# Patient Record
Sex: MALE | Race: WHITE | NOT HISPANIC OR LATINO | Employment: OTHER | URBAN - NONMETROPOLITAN AREA
[De-identification: names, ages, dates, MRNs, and addresses within clinical notes are randomized per-mention and may not be internally consistent; named-entity substitution may affect disease eponyms.]

---

## 2023-03-15 PROBLEM — C49.9 SOFT TISSUE SARCOMA (MULTI): Status: ACTIVE | Noted: 2023-03-15

## 2023-03-15 PROBLEM — I10 BENIGN HYPERTENSION: Status: ACTIVE | Noted: 2023-03-15

## 2023-03-15 PROBLEM — S31.109A WOUND OF ABDOMEN: Status: ACTIVE | Noted: 2023-03-15

## 2023-03-15 PROBLEM — M19.90 ARTHRITIS: Status: ACTIVE | Noted: 2023-03-15

## 2023-03-15 PROBLEM — J32.9 SINUSITIS: Status: ACTIVE | Noted: 2023-03-15

## 2023-03-15 PROBLEM — G47.00 INSOMNIA: Status: ACTIVE | Noted: 2023-03-15

## 2023-03-15 RX ORDER — LORATADINE 10 MG/1
1 TABLET ORAL DAILY PRN
COMMUNITY
End: 2023-12-18 | Stop reason: ALTCHOICE

## 2023-03-15 RX ORDER — ATENOLOL 50 MG/1
1 TABLET ORAL DAILY
COMMUNITY
Start: 2022-01-12 | End: 2023-09-26 | Stop reason: DRUGHIGH

## 2023-03-15 RX ORDER — AMLODIPINE BESYLATE 5 MG/1
1 TABLET ORAL DAILY
COMMUNITY
End: 2023-04-13 | Stop reason: ALTCHOICE

## 2023-03-15 NOTE — PROGRESS NOTES
Subjective   Patient ID:   Louie Urbina is a 77 y.o. male who presents for Follow-up, Hypertension, and Rash.  HPI  Here with acute symptoms:  Symptoms x over 1 week.  Itchy, red, scattered rash.  Located in between legs, behind his knees, on buttocks.  Has not improved.  No new detergents/clothing.    HTN:  Taking Amlodipine and Atenolol.  BP today is 159/84.  Improved slightly on re-check.  Denies dizziness, headaches.    Review of Systems  12 point review of systems negative unless stated above in HPI    Vitals:    03/16/23 1456   BP: 159/84   Pulse: 62   Resp: 18   SpO2: 97%       Physical Exam  General: Alert and oriented, well nourished, no acute distress.  Lungs: Clear to auscultation, non-labored respiration.  Heart: Normal rate, regular rhythm, no murmur, gallop or edema.  Neurologic: Awake, alert, and oriented X3, CN II-XII intact.  Psychiatric: Cooperative, appropriate mood and affect.  Skin: Erythematous, maculopapular rash in between both legs, behind both knees. Ringworm rash behind both knees as well    Assessment/Plan   This looks to be eczema vs. Ringworm for some of these lesions.  I have sent in Prednisone, Triamcinolone cream, and Clotrimazole cream.  Consider dermatology.  If symptoms persist or worsen despite current plan of care, please contact your healthcare provider for further evaluation.  Patient instructed to contact the office if there are any questions regarding their care or treatment.   [facility] [phone].  [facility] [phone].  Please start checking your BP at home and keep a daily log.  Call if getting over 140/90.    Fu 3 months  Diagnoses and all orders for this visit:  Benign hypertension  Eczema, unspecified type  -     predniSONE (Deltasone) 20 mg tablet; TAKE 2 TABLETS BY MOUTH DAYS 1-7, THEN 1 TABLET BY MOUTH DAYS 8-14.  -     triamcinolone (Kenalog) 0.1 % cream; Apply topically 2 times a day. Apply to affected area 1-2 times  daily as needed.  Ringworm  -     clotrimazole (Lotrimin) 1 % cream; Apply topically 2 times a day. apply to affected area

## 2023-03-16 ENCOUNTER — OFFICE VISIT (OUTPATIENT)
Dept: PRIMARY CARE | Facility: CLINIC | Age: 78
End: 2023-03-16
Payer: MEDICARE

## 2023-03-16 VITALS
OXYGEN SATURATION: 97 % | BODY MASS INDEX: 26.46 KG/M2 | SYSTOLIC BLOOD PRESSURE: 159 MMHG | WEIGHT: 189 LBS | DIASTOLIC BLOOD PRESSURE: 84 MMHG | HEIGHT: 71 IN | RESPIRATION RATE: 18 BRPM | HEART RATE: 62 BPM

## 2023-03-16 DIAGNOSIS — B35.9 RINGWORM: ICD-10-CM

## 2023-03-16 DIAGNOSIS — I10 BENIGN HYPERTENSION: Primary | ICD-10-CM

## 2023-03-16 DIAGNOSIS — L30.9 ECZEMA, UNSPECIFIED TYPE: ICD-10-CM

## 2023-03-16 PROCEDURE — 1159F MED LIST DOCD IN RCRD: CPT | Performed by: PHYSICIAN ASSISTANT

## 2023-03-16 PROCEDURE — 1160F RVW MEDS BY RX/DR IN RCRD: CPT | Performed by: PHYSICIAN ASSISTANT

## 2023-03-16 PROCEDURE — 3079F DIAST BP 80-89 MM HG: CPT | Performed by: PHYSICIAN ASSISTANT

## 2023-03-16 PROCEDURE — 99213 OFFICE O/P EST LOW 20 MIN: CPT | Performed by: PHYSICIAN ASSISTANT

## 2023-03-16 PROCEDURE — 3077F SYST BP >= 140 MM HG: CPT | Performed by: PHYSICIAN ASSISTANT

## 2023-03-16 RX ORDER — CLOTRIMAZOLE 1 %
CREAM (GRAM) TOPICAL 2 TIMES DAILY
Qty: 60 G | Refills: 1 | Status: SHIPPED | OUTPATIENT
Start: 2023-03-16 | End: 2023-04-13 | Stop reason: ALTCHOICE

## 2023-03-16 RX ORDER — PREDNISONE 20 MG/1
TABLET ORAL
Qty: 21 TABLET | Refills: 0 | Status: SHIPPED | OUTPATIENT
Start: 2023-03-16 | End: 2023-04-13 | Stop reason: ALTCHOICE

## 2023-03-16 RX ORDER — TRIAMCINOLONE ACETONIDE 1 MG/G
CREAM TOPICAL 2 TIMES DAILY
Qty: 45 G | Refills: 2 | Status: SHIPPED | OUTPATIENT
Start: 2023-03-16 | End: 2023-04-13 | Stop reason: ALTCHOICE

## 2023-03-16 ASSESSMENT — PATIENT HEALTH QUESTIONNAIRE - PHQ9
SUM OF ALL RESPONSES TO PHQ9 QUESTIONS 1 AND 2: 0
1. LITTLE INTEREST OR PLEASURE IN DOING THINGS: NOT AT ALL
2. FEELING DOWN, DEPRESSED OR HOPELESS: NOT AT ALL

## 2023-03-16 ASSESSMENT — PAIN SCALES - GENERAL: PAINLEVEL: 0-NO PAIN

## 2023-04-12 NOTE — PROGRESS NOTES
Subjective   Patient ID:   Louie Urbina is a 77 y.o. male who presents for No chief complaint on file..  HPI  Eczema vs. Ringworm:  I saw him in March 2023 for this.  I gave Prednisone, Clotrimazole, and Triamcinolone creams.  Itchy, red, scattered rash.  Located in between legs, behind his knees, on buttocks.  No new detergents/clothing.    HTN:  Taking Amlodipine and Atenolol.  BP today is  Denies dizziness, headaches.    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    Health maintenance:  Smoking: Former smoker.  PSA (50+): DUE  Labs: Feb 2023. DUE for PSA, lipid   Prevnar 13 (65+): DUE  Pneumovax 23 (65+, 1 year after Prev 13):  Influenza:  Zostavax (60+, 1 time, at pharmacy): DUE    Review of Systems  12 point review of systems negative unless stated above in HPI    There were no vitals filed for this visit.      Physical Exam  General: Alert and oriented, well nourished, no acute distress.  Lungs: Clear to auscultation, non-labored respiration.  Heart: Normal rate, regular rhythm, no murmur, gallop or edema.  Neurologic: Awake, alert, and oriented X3, CN II-XII intact.  Psychiatric: Cooperative, appropriate mood and affect.  Skin: Erythematous, maculopapular rash in between both legs, behind both knees. Ringworm rash behind both knees as well    Assessment/Plan   Diagnoses and all orders for this visit:  Benign hypertension  Eczema, unspecified type  Ringworm  Soft tissue sarcoma (CMS/HCC)  Screening PSA (prostate specific antigen)

## 2023-04-13 ENCOUNTER — OFFICE VISIT (OUTPATIENT)
Dept: PRIMARY CARE | Facility: CLINIC | Age: 78
End: 2023-04-13
Payer: MEDICARE

## 2023-04-13 VITALS
RESPIRATION RATE: 18 BRPM | BODY MASS INDEX: 27.23 KG/M2 | DIASTOLIC BLOOD PRESSURE: 86 MMHG | HEART RATE: 63 BPM | SYSTOLIC BLOOD PRESSURE: 158 MMHG | WEIGHT: 190.2 LBS | OXYGEN SATURATION: 97 % | HEIGHT: 70 IN

## 2023-04-13 DIAGNOSIS — B35.9 RINGWORM: ICD-10-CM

## 2023-04-13 DIAGNOSIS — C49.9 SOFT TISSUE SARCOMA (MULTI): ICD-10-CM

## 2023-04-13 DIAGNOSIS — Z12.5 SCREENING PSA (PROSTATE SPECIFIC ANTIGEN): ICD-10-CM

## 2023-04-13 DIAGNOSIS — L30.9 ECZEMA, UNSPECIFIED TYPE: ICD-10-CM

## 2023-04-13 DIAGNOSIS — I10 BENIGN HYPERTENSION: Primary | ICD-10-CM

## 2023-04-13 PROCEDURE — 1160F RVW MEDS BY RX/DR IN RCRD: CPT | Performed by: PHYSICIAN ASSISTANT

## 2023-04-13 PROCEDURE — 99213 OFFICE O/P EST LOW 20 MIN: CPT | Performed by: PHYSICIAN ASSISTANT

## 2023-04-13 PROCEDURE — 3079F DIAST BP 80-89 MM HG: CPT | Performed by: PHYSICIAN ASSISTANT

## 2023-04-13 PROCEDURE — 3077F SYST BP >= 140 MM HG: CPT | Performed by: PHYSICIAN ASSISTANT

## 2023-04-13 PROCEDURE — 1159F MED LIST DOCD IN RCRD: CPT | Performed by: PHYSICIAN ASSISTANT

## 2023-04-13 RX ORDER — AMLODIPINE BESYLATE 10 MG/1
10 TABLET ORAL DAILY
Qty: 90 TABLET | Refills: 1 | Status: SHIPPED | OUTPATIENT
Start: 2023-04-13 | End: 2024-01-26 | Stop reason: SDUPTHER

## 2023-04-13 RX ORDER — CLOTRIMAZOLE 1 %
CREAM (GRAM) TOPICAL 2 TIMES DAILY
Qty: 113 G | Refills: 3 | Status: SHIPPED | OUTPATIENT
Start: 2023-04-13 | End: 2023-05-13

## 2023-04-13 RX ORDER — TRIAMCINOLONE ACETONIDE 1 MG/G
CREAM TOPICAL 2 TIMES DAILY
Qty: 80 G | Refills: 3 | Status: SHIPPED | OUTPATIENT
Start: 2023-04-13 | End: 2023-12-18 | Stop reason: ALTCHOICE

## 2023-04-13 ASSESSMENT — PAIN SCALES - GENERAL: PAINLEVEL: 0-NO PAIN

## 2023-04-13 NOTE — PROGRESS NOTES
Subjective   Patient ID:   Louie Urbina is a 77 y.o. male who presents for Follow-up.  HPI  Eczema vs. Ringworm:  This has improved a bit.  I saw him in March 2023 for this.  I gave Prednisone, Clotrimazole, and Triamcinolone creams.  Itchy, red, scattered rash.  Located in between legs, behind his knees, on buttocks.  No new detergents/clothing.    HTN:  Taking Amlodipine and Atenolol.  BP today is 158/86.  BP was high last visit too.  Denies dizziness, headaches.    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    Health maintenance:  Smoking: Former smoker.  PSA (50+): DUE  Labs: Feb 2023. DUE for PSA, lipid   Prevnar 13 (65+): DUE  Pneumovax 23 (65+, 1 year after Prev 13):  Influenza:  Zostavax (60+, 1 time, at pharmacy): DUE    Review of Systems  12 point review of systems negative unless stated above in HPI    Vitals:    04/13/23 1352   BP: 158/86   Pulse: 63   Resp: 18   SpO2: 97%       Physical Exam  General: Alert and oriented, well nourished, no acute distress.  Lungs: Clear to auscultation, non-labored respiration.  Heart: Normal rate, regular rhythm, no murmur, gallop or edema.  Neurologic: Awake, alert, and oriented X3, CN II-XII intact.  Psychiatric: Cooperative, appropriate mood and affect.    Assessment/Plan   I am glad your rash is improving!  I have sent more of the creams in.  BP is high today.  I have increased your Amlodipine to 10mg.  Consider checking at home.  I have ordered some labs to be done as soon as you can.  We will call you with the results.  Continue the same medications.  Chronic conditions are stable.  Call with questions or concerns.    F/u  2-3 months  Diagnoses and all orders for this visit:  Benign hypertension  -     Lipid Panel; Future  Eczema, unspecified type  Ringworm  Soft tissue sarcoma (CMS/HCC)  Screening PSA (prostate specific antigen)  -     Prostate Specific Antigen, Screen; Future

## 2023-05-01 NOTE — PROGRESS NOTES
Subjective   Patient ID:   Louie Urbina is a 77 y.o. male who presents for Follow-up.  HPI  PSA and lipid were ordered and not done.    Eczema vs. Ringworm:  This has improved a bit.  I saw him in March 2023 for this.  I gave Prednisone, Clotrimazole, and Triamcinolone creams.  Itchy, red, scattered rash.  Located in between legs, behind his knees, on buttocks.  No new detergents/clothing.    HTN:  Had been taking Amlodipine and Atenolol.  He has been checking his BP at home and it was running low.  Interestingly, his heart rate was going high.  He has since stopped both the Amlodipine and Atenolol and both BP and heart rate have been good at home.  Feeling a little tired since stopping these.  BP is excellent in office today.  BP today is 125/84.  Denies dizziness, headaches.    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    Health maintenance:  Smoking: Former smoker.  PSA (50+): DUE  Labs: Feb 2023. DUE for PSA, lipid   Prevnar 13 (65+): DUE  Pneumovax 23 (65+, 1 year after Prev 13):  Influenza:  Zostavax (60+, 1 time, at pharmacy): DUE    Review of Systems  12 point review of systems negative unless stated above in HPI    Vitals:    05/04/23 1425   BP: 125/84   Pulse: 92   Resp: 18   SpO2: 97%         Physical Exam  General: Alert and oriented, well nourished, no acute distress.  Lungs: Clear to auscultation, non-labored respiration.  Heart: Normal rate, regular rhythm, no murmur, gallop or edema.  Neurologic: Awake, alert, and oriented X3, CN II-XII intact.  Psychiatric: Cooperative, appropriate mood and affect.    Assessment/Plan   We can try stopping both the Amlodipine and Atenolol for now.  Continue monitoring BP at home.  Call if this is going above 140/90.  I have ordered some labs to be done as soon as you can.  We will call you with the results.  Continue the same medications.  Chronic conditions are stable.  Call with questions or concerns.    F/u  6 months  Diagnoses and all orders for this  visit:  Benign hypertension  -     Lipid Panel; Future  Ringworm  Soft tissue sarcoma (CMS/HCC)  Screening PSA (prostate specific antigen)  -     Prostate Specific Antigen, Screen; Future

## 2023-05-04 ENCOUNTER — OFFICE VISIT (OUTPATIENT)
Dept: PRIMARY CARE | Facility: CLINIC | Age: 78
End: 2023-05-04
Payer: MEDICARE

## 2023-05-04 VITALS
DIASTOLIC BLOOD PRESSURE: 84 MMHG | WEIGHT: 181 LBS | SYSTOLIC BLOOD PRESSURE: 125 MMHG | HEART RATE: 92 BPM | HEIGHT: 70 IN | OXYGEN SATURATION: 97 % | RESPIRATION RATE: 18 BRPM | BODY MASS INDEX: 25.91 KG/M2

## 2023-05-04 DIAGNOSIS — Z12.5 SCREENING PSA (PROSTATE SPECIFIC ANTIGEN): ICD-10-CM

## 2023-05-04 DIAGNOSIS — B35.9 RINGWORM: ICD-10-CM

## 2023-05-04 DIAGNOSIS — C49.9 SOFT TISSUE SARCOMA (MULTI): ICD-10-CM

## 2023-05-04 DIAGNOSIS — I10 BENIGN HYPERTENSION: Primary | ICD-10-CM

## 2023-05-04 PROCEDURE — 3079F DIAST BP 80-89 MM HG: CPT | Performed by: PHYSICIAN ASSISTANT

## 2023-05-04 PROCEDURE — 1160F RVW MEDS BY RX/DR IN RCRD: CPT | Performed by: PHYSICIAN ASSISTANT

## 2023-05-04 PROCEDURE — 1159F MED LIST DOCD IN RCRD: CPT | Performed by: PHYSICIAN ASSISTANT

## 2023-05-04 PROCEDURE — 99213 OFFICE O/P EST LOW 20 MIN: CPT | Performed by: PHYSICIAN ASSISTANT

## 2023-05-04 PROCEDURE — 3074F SYST BP LT 130 MM HG: CPT | Performed by: PHYSICIAN ASSISTANT

## 2023-05-04 ASSESSMENT — PAIN SCALES - GENERAL: PAINLEVEL: 0-NO PAIN

## 2023-05-15 ENCOUNTER — TELEPHONE (OUTPATIENT)
Dept: PRIMARY CARE | Facility: CLINIC | Age: 78
End: 2023-05-15
Payer: MEDICARE

## 2023-05-15 DIAGNOSIS — B97.89 SORE THROAT (VIRAL): ICD-10-CM

## 2023-05-15 DIAGNOSIS — J02.8 SORE THROAT (VIRAL): ICD-10-CM

## 2023-05-15 RX ORDER — AZITHROMYCIN 500 MG/1
500 TABLET, FILM COATED ORAL DAILY
Qty: 5 TABLET | Refills: 0 | Status: SHIPPED | OUTPATIENT
Start: 2023-05-15 | End: 2023-05-20

## 2023-05-15 NOTE — TELEPHONE ENCOUNTER
Pt called stated he has a sore throat and clear sinus drainage  Discussed with Dr. Knox start zithromax 500mg daily   Can also try mucinex otc

## 2023-05-26 ENCOUNTER — TELEPHONE (OUTPATIENT)
Dept: PRIMARY CARE | Facility: CLINIC | Age: 78
End: 2023-05-26
Payer: MEDICARE

## 2023-05-26 DIAGNOSIS — J32.9 SINUSITIS, UNSPECIFIED CHRONICITY, UNSPECIFIED LOCATION: Primary | ICD-10-CM

## 2023-05-26 RX ORDER — DOXYCYCLINE 100 MG/1
100 CAPSULE ORAL 2 TIMES DAILY
Qty: 20 CAPSULE | Refills: 0 | Status: SHIPPED | OUTPATIENT
Start: 2023-05-26 | End: 2023-06-05

## 2023-08-09 ENCOUNTER — TELEPHONE (OUTPATIENT)
Dept: PRIMARY CARE | Facility: CLINIC | Age: 78
End: 2023-08-09
Payer: MEDICARE

## 2023-08-09 NOTE — TELEPHONE ENCOUNTER
----- Message from Alejandra Yen LPN sent at 7/19/2023  3:57 PM EDT -----  Due for medicare phys

## 2023-08-23 ENCOUNTER — OFFICE VISIT (OUTPATIENT)
Dept: PRIMARY CARE | Facility: CLINIC | Age: 78
End: 2023-08-23
Payer: MEDICARE

## 2023-08-23 VITALS
WEIGHT: 183.8 LBS | RESPIRATION RATE: 18 BRPM | DIASTOLIC BLOOD PRESSURE: 109 MMHG | HEART RATE: 70 BPM | BODY MASS INDEX: 26.31 KG/M2 | HEIGHT: 70 IN | SYSTOLIC BLOOD PRESSURE: 184 MMHG | OXYGEN SATURATION: 98 %

## 2023-08-23 DIAGNOSIS — Z00.00 MEDICARE ANNUAL WELLNESS VISIT, SUBSEQUENT: ICD-10-CM

## 2023-08-23 DIAGNOSIS — Z12.11 COLON CANCER SCREENING: ICD-10-CM

## 2023-08-23 DIAGNOSIS — I10 BENIGN HYPERTENSION: ICD-10-CM

## 2023-08-23 PROBLEM — D48.5 NEOPLASM OF UNCERTAIN BEHAVIOR OF SKIN: Status: ACTIVE | Noted: 2023-08-01

## 2023-08-23 PROBLEM — L91.8 OTHER HYPERTROPHIC DISORDERS OF THE SKIN: Status: ACTIVE | Noted: 2023-08-01

## 2023-08-23 PROBLEM — D22.60 MELANOCYTIC NEVI OF UNSPECIFIED UPPER LIMB, INCLUDING SHOULDER: Status: ACTIVE | Noted: 2023-08-01

## 2023-08-23 PROBLEM — D22.70 MELANOCYTIC NEVI OF UNSPECIFIED LOWER LIMB, INCLUDING HIP: Status: ACTIVE | Noted: 2023-08-01

## 2023-08-23 PROBLEM — L82.1 OTHER SEBORRHEIC KERATOSIS: Status: ACTIVE | Noted: 2023-08-01

## 2023-08-23 PROBLEM — D22.5 MELANOCYTIC NEVI OF TRUNK: Status: ACTIVE | Noted: 2023-08-01

## 2023-08-23 PROBLEM — D18.01 HEMANGIOMA OF SKIN AND SUBCUTANEOUS TISSUE: Status: ACTIVE | Noted: 2023-08-01

## 2023-08-23 PROBLEM — L81.4 OTHER MELANIN HYPERPIGMENTATION: Status: ACTIVE | Noted: 2023-08-01

## 2023-08-23 PROBLEM — L80 VITILIGO: Status: ACTIVE | Noted: 2023-08-01

## 2023-08-23 PROCEDURE — 3077F SYST BP >= 140 MM HG: CPT | Performed by: INTERNAL MEDICINE

## 2023-08-23 PROCEDURE — G0439 PPPS, SUBSEQ VISIT: HCPCS | Performed by: INTERNAL MEDICINE

## 2023-08-23 PROCEDURE — 3080F DIAST BP >= 90 MM HG: CPT | Performed by: INTERNAL MEDICINE

## 2023-08-23 PROCEDURE — 1160F RVW MEDS BY RX/DR IN RCRD: CPT | Performed by: INTERNAL MEDICINE

## 2023-08-23 PROCEDURE — 1159F MED LIST DOCD IN RCRD: CPT | Performed by: INTERNAL MEDICINE

## 2023-08-23 PROCEDURE — 1170F FXNL STATUS ASSESSED: CPT | Performed by: INTERNAL MEDICINE

## 2023-08-23 PROCEDURE — 1125F AMNT PAIN NOTED PAIN PRSNT: CPT | Performed by: INTERNAL MEDICINE

## 2023-08-23 PROCEDURE — 99213 OFFICE O/P EST LOW 20 MIN: CPT | Performed by: INTERNAL MEDICINE

## 2023-08-23 ASSESSMENT — PATIENT HEALTH QUESTIONNAIRE - PHQ9
2. FEELING DOWN, DEPRESSED OR HOPELESS: NOT AT ALL
1. LITTLE INTEREST OR PLEASURE IN DOING THINGS: NOT AT ALL
SUM OF ALL RESPONSES TO PHQ9 QUESTIONS 1 AND 2: 0

## 2023-08-23 ASSESSMENT — ACTIVITIES OF DAILY LIVING (ADL)
TAKING_MEDICATION: INDEPENDENT
BATHING: INDEPENDENT
DOING_HOUSEWORK: INDEPENDENT
MANAGING_FINANCES: INDEPENDENT
DRESSING: INDEPENDENT
GROCERY_SHOPPING: INDEPENDENT

## 2023-08-23 ASSESSMENT — PAIN SCALES - GENERAL: PAINLEVEL: 1

## 2023-08-23 NOTE — PROGRESS NOTES
"Patient ID: He states he has not been testing his BP for several weeks now, because it had been good. He stopped taking his BP medications after last follow up with ELVIRA Yuan. He states that he wakes up to urinate 2-3 times per night if he drinks too much at night. He denies any depression. States he has been eating ok. He denies any weight loss. He states he has been feeling well, denies any lightheadedness or dizziness. He states he used to sleep on his back, now sleeps on his sides and he gets a \"crick\" in his neck. He states that he does yard work, stays fairly active.     HPI: Louie Urbina is a 78 y.o. male with PMH remarkable for who presents to the office today for Medicare Annual Wellness Visit Subsequent.    HEALTH MAINTENANCE: Annual Wellness Physical  Smoking: non smoker  Labs: 6/16/23  PSA: DUE  Colonoscopy (45-75): 15 years ago, negative,does not want to repeat test, agreeable to do Cologuard  Lung cancer screening (55-80 + 30 pack year + smoking/quit in last 15 years):   DEXA (65+, q 2 years):     SOCIAL HISTORY:  Social History     Tobacco Use    Smoking status: Some Days     Types: Cigarettes    Smokeless tobacco: Never   Substance Use Topics    Alcohol use: Yes     Comment: occasional beer    Drug use: Never     IMMUNIZATIONS:    There is no immunization history on file for this patient.    REVIEW OF SYSTEMS:  Review of Systems  NEGATIVE    ALLERGIES:  No Known Allergies     VITAL SIGNS:  Vitals:    08/23/23 1507   BP: (!) 184/109   Pulse: 70   Resp: 18   SpO2: 98%     Physical Exam  Constitutional:       Appearance: Normal appearance. He is normal weight.   HENT:      Head: Normocephalic and atraumatic.   Eyes:      Extraocular Movements: Extraocular movements intact.      Pupils: Pupils are equal, round, and reactive to light.   Cardiovascular:      Rate and Rhythm: Normal rate and regular rhythm.      Pulses: Normal pulses.      Heart sounds: Normal heart sounds.   Pulmonary:      " Effort: Pulmonary effort is normal.      Breath sounds: Normal breath sounds.   Abdominal:      General: Bowel sounds are normal.      Palpations: Abdomen is soft.   Musculoskeletal:         General: Normal range of motion.      Cervical back: Normal range of motion and neck supple.   Skin:     General: Skin is warm and dry.   Neurological:      General: No focal deficit present.      Mental Status: He is alert and oriented to person, place, and time.   Psychiatric:         Mood and Affect: Mood normal.         Behavior: Behavior normal.       MEDICATIONS:  Current Outpatient Medications on File Prior to Visit   Medication Sig Dispense Refill    amLODIPine (Norvasc) 10 mg tablet Take 1 tablet (10 mg) by mouth once daily. (Patient not taking: Reported on 5/4/2023) 90 tablet 1    atenolol (Tenormin) 50 mg tablet Take 1 tablet (50 mg) by mouth once daily.      loratadine (Claritin) 10 mg tablet Take 1 tablet (10 mg) by mouth once daily as needed for allergies.      triamcinolone (Kenalog) 0.1 % cream Apply topically 2 times a day. Apply to affected area 1-2 times daily as needed. (Patient not taking: Reported on 8/23/2023) 80 g 3     No current facility-administered medications on file prior to visit.        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, s/p chemo, HTN, cataract   - reviewed most recent bloodwork from 6/16/23  - he states he has been feeling good, has been eating good, appetite is ok  -  he does not wish to have any further colonoscopies, is agreeable to do Cologuard, order inputted  - denies any depression  - he had recent follow up with ELVIRA Yuan on 5/4/23, BP medications were stopped at that time due to normal BP off of medications and patient reporting his BP was running low at home  - his BP is significantly elevated today, 184/109  - he reports he has not been monitoring it at home for last several weeks as it had been very stable so he stopped  - advised to resume Atenolol, ok to start with 25mg daily(advised to cut 50mg tablet in half)  - advised to continue to hold on Amlodipine  - advised to monitor BP 3X / week and call office with results in 3 weeks  - Follow up in four months    --------------------  Written by Vivian Oviedo LPN, acting as a scribe for Dr. Knox. This note accurately reflects the work and decisions made by Dr. Knox.     I, Dr. Knox, attest all medical record entries made by the scribe were under my direction and were personally dictated by me. I have reviewed the chart and agree that the record accurately reflects my performance of the history, physical exam, and assessment and plan.

## 2023-08-23 NOTE — PATIENT INSTRUCTIONS
It was nice to see you today for your annual Medicare Wellness visit.  As discussed during our visit today ...  We will order Cologuard stool test, please complete when you are able  Start to take Atenolol 25mg every day(Ok to cut 50mg tablet in half)  DO NOT TAKE AMLODIPINE AT THIS TIME  Monitor your BP at home 2-3 times per week starting on Monday and call us with results after 3 weeks  Follow up in four months

## 2023-09-26 DIAGNOSIS — I10 BENIGN HYPERTENSION: ICD-10-CM

## 2023-09-27 RX ORDER — ATENOLOL 25 MG/1
25 TABLET ORAL DAILY
Qty: 90 TABLET | Refills: 0 | Status: SHIPPED | OUTPATIENT
Start: 2023-09-27 | End: 2024-06-06

## 2023-10-03 LAB — NONINV COLON CA DNA+OCC BLD SCRN STL QL: POSITIVE

## 2023-10-04 DIAGNOSIS — R19.5 POSITIVE COLORECTAL CANCER SCREENING USING COLOGUARD TEST: Primary | ICD-10-CM

## 2023-10-05 DIAGNOSIS — R19.5 POSITIVE COLORECTAL CANCER SCREENING USING COLOGUARD TEST: Primary | ICD-10-CM

## 2023-10-06 ENCOUNTER — CLINICAL SUPPORT (OUTPATIENT)
Dept: UROLOGY | Facility: CLINIC | Age: 78
End: 2023-10-06
Payer: MEDICARE

## 2023-11-02 DIAGNOSIS — R05.9 COUGH, UNSPECIFIED TYPE: Primary | ICD-10-CM

## 2023-11-02 RX ORDER — FLUTICASONE PROPIONATE 50 MCG
1 SPRAY, SUSPENSION (ML) NASAL DAILY
Qty: 16 G | Refills: 0 | Status: SHIPPED | OUTPATIENT
Start: 2023-11-02 | End: 2023-12-18 | Stop reason: ALTCHOICE

## 2023-11-02 RX ORDER — GUAIFENESIN 600 MG/1
600 TABLET, EXTENDED RELEASE ORAL 2 TIMES DAILY
Qty: 10 TABLET | Refills: 0 | Status: SHIPPED | OUTPATIENT
Start: 2023-11-02 | End: 2023-11-07

## 2023-11-02 RX ORDER — AZITHROMYCIN 500 MG/1
500 TABLET, FILM COATED ORAL DAILY
Qty: 5 TABLET | Refills: 0 | Status: SHIPPED | OUTPATIENT
Start: 2023-11-02 | End: 2023-11-07

## 2023-11-08 ENCOUNTER — APPOINTMENT (OUTPATIENT)
Dept: DERMATOLOGY | Facility: CLINIC | Age: 78
End: 2023-11-08
Payer: MEDICARE

## 2023-12-18 ENCOUNTER — ANESTHESIA EVENT (OUTPATIENT)
Dept: ANESTHESIOLOGY | Facility: HOSPITAL | Age: 78
End: 2023-12-18

## 2023-12-18 ENCOUNTER — PRE-ADMISSION TESTING (OUTPATIENT)
Dept: PREADMISSION TESTING | Facility: HOSPITAL | Age: 78
End: 2023-12-18
Payer: MEDICARE

## 2023-12-18 PROBLEM — J44.9 CHRONIC OBSTRUCTIVE PULMONARY DISEASE (MULTI): Status: ACTIVE | Noted: 2023-12-18

## 2023-12-18 PROBLEM — N40.0 BPH (BENIGN PROSTATIC HYPERPLASIA): Status: ACTIVE | Noted: 2023-12-18

## 2023-12-18 RX ORDER — BISMUTH SUBSALICYLATE 262 MG
1 TABLET,CHEWABLE ORAL DAILY
COMMUNITY

## 2023-12-18 RX ORDER — AMLODIPINE BESYLATE 5 MG/1
5 TABLET ORAL DAILY
COMMUNITY
End: 2024-03-08

## 2023-12-18 ASSESSMENT — DUKE ACTIVITY SCORE INDEX (DASI)
CAN YOU DO MODERATE WORK AROUND THE HOUSE LIKE VACUUMING, SWEEPING FLOORS OR CARRYING GROCERIES: YES
CAN YOU CLIMB A FLIGHT OF STAIRS OR WALK UP A HILL: YES
CAN YOU HAVE SEXUAL RELATIONS: YES
TOTAL_SCORE: 36.7
CAN YOU RUN A SHORT DISTANCE: YES
CAN YOU WALK INDOORS, SUCH AS AROUND YOUR HOUSE: YES
CAN YOU DO LIGHT WORK AROUND THE HOUSE LIKE DUSTING OR WASHING DISHES: YES
CAN YOU TAKE CARE OF YOURSELF (EAT, DRESS, BATHE, OR USE TOILET): YES
CAN YOU DO YARD WORK LIKE RAKING LEAVES, WEEDING OR PUSHING A MOWER: YES
CAN YOU PARTICIPATE IN MODERATE RECREATIONAL ACTIVITIES LIKE GOLF, BOWLING, DANCING, DOUBLES TENNIS OR THROWING A BASEBALL OR FOOTBALL: NO
CAN YOU PARTICIPATE IN STRENOUS SPORTS LIKE SWIMMING, SINGLES TENNIS, FOOTBALL, BASKETBALL, OR SKIING: NO
CAN YOU DO HEAVY WORK AROUND THE HOUSE LIKE SCRUBBING FLOORS OR LIFTING AND MOVING HEAVY FURNITURE: NO
CAN YOU WALK A BLOCK OR TWO ON LEVEL GROUND: YES
DASI METS SCORE: 7.3

## 2023-12-18 NOTE — PREPROCEDURE INSTRUCTIONS
Medication List            Accurate as of December 18, 2023  9:34 AM. Always use your most recent med list.                * amLODIPine 10 mg tablet  Commonly known as: Norvasc  Take 1 tablet (10 mg) by mouth once daily.  Medication Adjustments for Surgery: Other (Comment)     * amLODIPine 5 mg tablet  Commonly known as: Norvasc  Medication Adjustments for Surgery: Take morning of surgery with sip of water, no other fluids     atenolol 25 mg tablet  Commonly known as: Tenormin  Take 1 tablet (25 mg) by mouth once daily.  Medication Adjustments for Surgery: Take morning of surgery with sip of water, no other fluids     multivitamin tablet  Medication Adjustments for Surgery: Continue until night before surgery           * This list has 2 medication(s) that are the same as other medications prescribed for you. Read the directions carefully, and ask your doctor or other care provider to review them with you.                                  NPO Instructions:    Follow instructions. Day before procedure-may have light breakfast by 9am (ex. 1 egg, 1 piece of toast).  Then CLEAR LIQUIDS ONLY-No dairy products, nothing red/purple.  Take first part of prep- Evening Before Procedure.  Drink lots of liquids.  Day of Procedure- 3-4am Take Second Part of Prep.   STOP DRINKING 3 HOURS PRIOR TO PROCEDURE.  Take medications as instructed.      Additional Instructions:     Review your medication instructions, take indicated medications  Wear  comfortable loose fitting clothing  All jewelry and valuables should be left at home    Park in back of hospital by ER. Come up to Second floor-Outpt dept to check in.  Bring Photo ID and Insurance card,     You MUST have a  with you.  If you get ill at all before your procedure- CALL YOUR DOCTOR/SURGEON. We want you in the best shape that is possible. Any sickness might lead to your procedure being delayed.      Call Outpatient dept at [phone] the night before your procedure  (Friday for Monday procedure), between 1-3 pm.

## 2023-12-18 NOTE — ANESTHESIA PREPROCEDURE EVALUATION
Patient: Louie Urbina    Procedure Information    Date: 12/18/23  Reason: PAT         Relevant Problems   Cardiovascular   (+) Benign hypertension      Pulmonary   (+) Chronic obstructive pulmonary disease (CMS/HCC)      Infectious Disease   (+) Ringworm      Genitourinary   (+) BPH (benign prostatic hyperplasia)      Infectious/Inflammatory   (+) Eczema      Hematology and Neoplasia   (+) Soft tissue sarcoma (CMS/HCC) (Metastatic, abdominal mass primary with lymph node involvement- s/p chemo and surgery)      Other   (+) Arthritis       Clinical information reviewed:               Chart reviewed.  Surgery under GA in 2021, easy airway with no anesthesia complications or issues. No clearance ordered.    ECG 2021  Interpretation Summary    Sinus rhythm with occasional premature ventricular complexes  Right bundle branch block  Left anterior fascicular block  Bifascicular block   Abnormal ECG    NPO Detail:  No data recorded     PHYSICAL EXAM    Anesthesia Plan    There were no vitals filed for this visit.    Past Surgical History:   Procedure Laterality Date    OTHER SURGICAL HISTORY  03/26/2021    Abdominal surgery     Past Medical History:   Diagnosis Date    Other specified postprocedural states     Status post repair of complex wound       Current Outpatient Medications:     amLODIPine (Norvasc) 10 mg tablet, Take 1 tablet (10 mg) by mouth once daily. (Patient not taking: Reported on 5/4/2023), Disp: 90 tablet, Rfl: 1    atenolol (Tenormin) 25 mg tablet, Take 1 tablet (25 mg) by mouth once daily., Disp: 90 tablet, Rfl: 0    fluticasone (Flonase) 50 mcg/actuation nasal spray, Administer 1 spray into each nostril once daily. Shake gently. Before first use, prime pump. After use, clean tip and replace cap., Disp: 16 g, Rfl: 0    loratadine (Claritin) 10 mg tablet, Take 1 tablet (10 mg) by mouth once daily as needed for allergies., Disp: , Rfl:     triamcinolone (Kenalog) 0.1 % cream, Apply topically 2 times a  day. Apply to affected area 1-2 times daily as needed. (Patient not taking: Reported on 8/23/2023), Disp: 80 g, Rfl: 3  Prior to Admission medications    Medication Sig Start Date End Date Taking? Authorizing Provider   amLODIPine (Norvasc) 10 mg tablet Take 1 tablet (10 mg) by mouth once daily.  Patient not taking: Reported on 5/4/2023 4/13/23 10/10/23  Aedn Cordoba PA-C   atenolol (Tenormin) 25 mg tablet Take 1 tablet (25 mg) by mouth once daily. 9/27/23 3/25/24  Desean Tinajero MD   fluticasone (Flonase) 50 mcg/actuation nasal spray Administer 1 spray into each nostril once daily. Shake gently. Before first use, prime pump. After use, clean tip and replace cap. 11/2/23 11/1/24  Desean Tinajero MD   loratadine (Claritin) 10 mg tablet Take 1 tablet (10 mg) by mouth once daily as needed for allergies.    Historical Provider, MD   triamcinolone (Kenalog) 0.1 % cream Apply topically 2 times a day. Apply to affected area 1-2 times daily as needed.  Patient not taking: Reported on 8/23/2023 4/13/23   Aden Cordoba PA-C     No Known Allergies  Social History     Tobacco Use    Smoking status: Some Days     Types: Cigarettes    Smokeless tobacco: Never   Substance Use Topics    Alcohol use: Yes     Comment: occasional beer         Chemistry    Lab Results   Component Value Date/Time     09/25/2023 1151    K 4.4 09/25/2023 1151     09/25/2023 1151    CO2 29 09/25/2023 1151    BUN 19 09/25/2023 1151    CREATININE 1.00 09/25/2023 1152    Lab Results   Component Value Date/Time    CALCIUM 9.5 09/25/2023 1151    ALKPHOS 85 09/25/2023 1151    AST 17 09/25/2023 1151    ALT 25 09/25/2023 1151    BILITOT 0.6 09/25/2023 1151          Lab Results   Component Value Date/Time    WBC 7.0 09/25/2023 1152    HGB 15.1 09/25/2023 1152    HCT 46.0 09/25/2023 1152     09/25/2023 1152     Lab Results   Component Value Date/Time    PROTIME 18.6 (H) 03/10/2021 0529    INR 1.6 (H) 03/10/2021 0529

## 2023-12-23 ENCOUNTER — PREP FOR PROCEDURE (OUTPATIENT)
Dept: SURGERY | Facility: HOSPITAL | Age: 78
End: 2023-12-23
Payer: MEDICARE

## 2023-12-23 RX ORDER — ONDANSETRON HYDROCHLORIDE 2 MG/ML
4 INJECTION, SOLUTION INTRAVENOUS ONCE AS NEEDED
Status: CANCELLED | OUTPATIENT
Start: 2023-12-23

## 2023-12-23 RX ORDER — SODIUM CHLORIDE, SODIUM LACTATE, POTASSIUM CHLORIDE, CALCIUM CHLORIDE 600; 310; 30; 20 MG/100ML; MG/100ML; MG/100ML; MG/100ML
100 INJECTION, SOLUTION INTRAVENOUS CONTINUOUS
Status: CANCELLED | OUTPATIENT
Start: 2023-12-23

## 2023-12-30 ENCOUNTER — HOSPITAL ENCOUNTER (OUTPATIENT)
Dept: GASTROENTEROLOGY | Facility: HOSPITAL | Age: 78
Setting detail: OUTPATIENT SURGERY
Discharge: HOME | End: 2023-12-30
Payer: MEDICARE

## 2023-12-30 ENCOUNTER — ANESTHESIA EVENT (OUTPATIENT)
Dept: GASTROENTEROLOGY | Facility: HOSPITAL | Age: 78
End: 2023-12-30
Payer: MEDICARE

## 2023-12-30 ENCOUNTER — ANESTHESIA (OUTPATIENT)
Dept: GASTROENTEROLOGY | Facility: HOSPITAL | Age: 78
End: 2023-12-30
Payer: MEDICARE

## 2023-12-30 VITALS
BODY MASS INDEX: 25.82 KG/M2 | WEIGHT: 180.34 LBS | HEART RATE: 62 BPM | OXYGEN SATURATION: 99 % | TEMPERATURE: 96.8 F | SYSTOLIC BLOOD PRESSURE: 102 MMHG | DIASTOLIC BLOOD PRESSURE: 71 MMHG | HEIGHT: 70 IN | RESPIRATION RATE: 16 BRPM

## 2023-12-30 DIAGNOSIS — R19.5 POSITIVE COLORECTAL CANCER SCREENING USING COLOGUARD TEST: ICD-10-CM

## 2023-12-30 PROCEDURE — 45385 COLONOSCOPY W/LESION REMOVAL: CPT | Performed by: SURGERY

## 2023-12-30 PROCEDURE — 2500000004 HC RX 250 GENERAL PHARMACY W/ HCPCS (ALT 636 FOR OP/ED): Mod: SE | Performed by: NURSE ANESTHETIST, CERTIFIED REGISTERED

## 2023-12-30 PROCEDURE — 3700000001 HC GENERAL ANESTHESIA TIME - INITIAL BASE CHARGE

## 2023-12-30 PROCEDURE — 45380 COLONOSCOPY AND BIOPSY: CPT | Performed by: SURGERY

## 2023-12-30 PROCEDURE — 94760 N-INVAS EAR/PLS OXIMETRY 1: CPT | Mod: CCI

## 2023-12-30 PROCEDURE — 2500000004 HC RX 250 GENERAL PHARMACY W/ HCPCS (ALT 636 FOR OP/ED): Mod: SE | Performed by: SURGERY

## 2023-12-30 PROCEDURE — 2500000005 HC RX 250 GENERAL PHARMACY W/O HCPCS: Mod: SE | Performed by: NURSE ANESTHETIST, CERTIFIED REGISTERED

## 2023-12-30 PROCEDURE — 88305 TISSUE EXAM BY PATHOLOGIST: CPT | Performed by: PATHOLOGY

## 2023-12-30 PROCEDURE — 3700000002 HC GENERAL ANESTHESIA TIME - EACH INCREMENTAL 1 MINUTE

## 2023-12-30 PROCEDURE — 2720000007 HC OR 272 NO HCPCS

## 2023-12-30 PROCEDURE — 88305 TISSUE EXAM BY PATHOLOGIST: CPT | Mod: TC,SUR,GENLAB | Performed by: SURGERY

## 2023-12-30 PROCEDURE — 7100000009 HC PHASE TWO TIME - INITIAL BASE CHARGE

## 2023-12-30 PROCEDURE — 7100000010 HC PHASE TWO TIME - EACH INCREMENTAL 1 MINUTE

## 2023-12-30 RX ORDER — PROPOFOL 10 MG/ML
INJECTION, EMULSION INTRAVENOUS AS NEEDED
Status: DISCONTINUED | OUTPATIENT
Start: 2023-12-30 | End: 2023-12-30

## 2023-12-30 RX ORDER — SODIUM CHLORIDE, SODIUM LACTATE, POTASSIUM CHLORIDE, CALCIUM CHLORIDE 600; 310; 30; 20 MG/100ML; MG/100ML; MG/100ML; MG/100ML
100 INJECTION, SOLUTION INTRAVENOUS CONTINUOUS
Status: DISCONTINUED | OUTPATIENT
Start: 2023-12-30 | End: 2023-12-31 | Stop reason: HOSPADM

## 2023-12-30 RX ORDER — ONDANSETRON HYDROCHLORIDE 2 MG/ML
4 INJECTION, SOLUTION INTRAVENOUS ONCE AS NEEDED
Status: DISCONTINUED | OUTPATIENT
Start: 2023-12-30 | End: 2023-12-31 | Stop reason: HOSPADM

## 2023-12-30 RX ORDER — LIDOCAINE HYDROCHLORIDE 20 MG/ML
INJECTION, SOLUTION INFILTRATION; PERINEURAL AS NEEDED
Status: DISCONTINUED | OUTPATIENT
Start: 2023-12-30 | End: 2023-12-30

## 2023-12-30 RX ADMIN — PROPOFOL 20 MG: 10 INJECTION, EMULSION INTRAVENOUS at 08:25

## 2023-12-30 RX ADMIN — PROPOFOL 20 MG: 10 INJECTION, EMULSION INTRAVENOUS at 08:10

## 2023-12-30 RX ADMIN — PROPOFOL 20 MG: 10 INJECTION, EMULSION INTRAVENOUS at 08:12

## 2023-12-30 RX ADMIN — LIDOCAINE HYDROCHLORIDE 100 MG: 20 INJECTION, SOLUTION INFILTRATION; PERINEURAL at 08:06

## 2023-12-30 RX ADMIN — PROPOFOL 20 MG: 10 INJECTION, EMULSION INTRAVENOUS at 08:19

## 2023-12-30 RX ADMIN — PROPOFOL 10 MG: 10 INJECTION, EMULSION INTRAVENOUS at 08:17

## 2023-12-30 RX ADMIN — PROPOFOL 20 MG: 10 INJECTION, EMULSION INTRAVENOUS at 08:14

## 2023-12-30 RX ADMIN — SODIUM CHLORIDE, POTASSIUM CHLORIDE, SODIUM LACTATE AND CALCIUM CHLORIDE 100 ML/HR: 600; 310; 30; 20 INJECTION, SOLUTION INTRAVENOUS at 07:40

## 2023-12-30 RX ADMIN — PROPOFOL 20 MG: 10 INJECTION, EMULSION INTRAVENOUS at 08:23

## 2023-12-30 RX ADMIN — PROPOFOL 20 MG: 10 INJECTION, EMULSION INTRAVENOUS at 08:21

## 2023-12-30 RX ADMIN — PROPOFOL 100 MG: 10 INJECTION, EMULSION INTRAVENOUS at 08:06

## 2023-12-30 RX ADMIN — PROPOFOL 20 MG: 10 INJECTION, EMULSION INTRAVENOUS at 08:08

## 2023-12-30 RX ADMIN — PROPOFOL 20 MG: 10 INJECTION, EMULSION INTRAVENOUS at 08:16

## 2023-12-30 SDOH — HEALTH STABILITY: MENTAL HEALTH: CURRENT SMOKER: 0

## 2023-12-30 ASSESSMENT — PAIN - FUNCTIONAL ASSESSMENT
PAIN_FUNCTIONAL_ASSESSMENT: 0-10
PAIN_FUNCTIONAL_ASSESSMENT: FLACC (FACE, LEGS, ACTIVITY, CRY, CONSOLABILITY)
PAIN_FUNCTIONAL_ASSESSMENT: 0-10

## 2023-12-30 ASSESSMENT — PAIN SCALES - GENERAL
PAINLEVEL_OUTOF10: 0 - NO PAIN
PAINLEVEL_OUTOF10: 0 - NO PAIN
PAIN_LEVEL: 0
PAINLEVEL_OUTOF10: 0 - NO PAIN

## 2023-12-30 ASSESSMENT — COLUMBIA-SUICIDE SEVERITY RATING SCALE - C-SSRS
2. HAVE YOU ACTUALLY HAD ANY THOUGHTS OF KILLING YOURSELF?: NO
1. IN THE PAST MONTH, HAVE YOU WISHED YOU WERE DEAD OR WISHED YOU COULD GO TO SLEEP AND NOT WAKE UP?: NO
6. HAVE YOU EVER DONE ANYTHING, STARTED TO DO ANYTHING, OR PREPARED TO DO ANYTHING TO END YOUR LIFE?: NO

## 2023-12-30 NOTE — ANESTHESIA POSTPROCEDURE EVALUATION
Patient: Louie Urbina    Procedure Summary       Date: 12/30/23 Room / Location: [facility]    Anesthesia Start: 0805 Anesthesia Stop: 0829    Procedures:       AMB REFERRAL TO GENERAL SURGERY      COLONOSCOPY Diagnosis: Positive colorectal cancer screening using Cologuard test    Scheduled Providers: Gilbert Nguyen MD Responsible Provider: SHARAD Stark    Anesthesia Type: MAC ASA Status: 3            Anesthesia Type: MAC    Vitals Value Taken Time   BP 79/48 12/30/23 0828   Temp 36 °C (96.8 °F) 12/30/23 0828   Pulse 64 12/30/23 0828   Resp 16 12/30/23 0828   SpO2 95 % 12/30/23 0828       Anesthesia Post Evaluation    Patient location during evaluation: PACU  Patient participation: complete - patient participated  Level of consciousness: awake  Pain score: 0  Pain management: adequate  Multimodal analgesia pain management approach  Airway patency: patent  Two or more strategies used to mitigate risk of obstructive sleep apnea  Cardiovascular status: hypotensive and stable  Respiratory status: acceptable  Hydration status: acceptable  Postoperative Nausea and Vomiting: none  Comments: Giving fluid bolus before discharge.        There were no known notable events for this encounter.

## 2023-12-30 NOTE — H&P
History Of Present Illness  Louie Urbina is a 78 y.o. male presenting for colonoscopy for positive Cologuard     Past Medical History  Past Medical History:   Diagnosis Date    Hypertension     Other specified postprocedural states     Status post repair of complex wound       Surgical History  Past Surgical History:   Procedure Laterality Date    OTHER SURGICAL HISTORY  03/26/2021    Abdominal surgery-Sarcoid Tumor Removed    TONSILLECTOMY          Social History  He reports that he quit smoking 13 days ago. His smoking use included cigarettes. He has a 20.00 pack-year smoking history. He has never used smokeless tobacco. He reports current alcohol use. He reports that he does not use drugs.    Family History  No family history on file.     Allergies  Patient has no known allergies.    Review of Systems   All other systems reviewed and are negative.       Physical Exam  Vitals reviewed.   Constitutional:       Appearance: Normal appearance.   HENT:      Head: Normocephalic.   Cardiovascular:      Rate and Rhythm: Normal rate and regular rhythm.      Heart sounds: Normal heart sounds.   Pulmonary:      Effort: Pulmonary effort is normal.      Breath sounds: Normal breath sounds.   Abdominal:      General: Abdomen is flat. There is no distension.      Palpations: Abdomen is soft. There is no mass.      Tenderness: There is no abdominal tenderness. There is no guarding.      Hernia: No hernia is present.   Genitourinary:     Testes: Normal.   Musculoskeletal:         General: Normal range of motion.      Cervical back: Normal range of motion.   Skin:     General: Skin is warm.   Neurological:      General: No focal deficit present.   Psychiatric:         Mood and Affect: Mood normal.          Last Recorded Vitals  There were no vitals taken for this visit.    Relevant Results         Assessment/Plan   Active Problems: Positive Cologuard   There are no active Hospital Problems.      COLONOSCOPY/BIOPSIES.  Risks  include, but not limited to pain, infection, bleeding, perforation, missed lesions, aspiration, risk of cardiac, pulmonary, neurologic, locomotor, anesthetic events, incomplete colonoscopy, and other unforeseen complications including death      Gilbert Nguyen MD

## 2023-12-30 NOTE — ANESTHESIA PREPROCEDURE EVALUATION
Patient: Louie Urbina    Procedure Information       Date/Time: 12/30/23 0800    Scheduled providers: Gilbert Nguyen MD    Procedures:       AMB REFERRAL TO GENERAL SURGERY      COLONOSCOPY    Location: [facility]            Relevant Problems   Cardiovascular   (+) Benign hypertension      Pulmonary   (+) Chronic obstructive pulmonary disease (CMS/HCC)      Infectious Disease   (+) Ringworm      Other   (+) Arthritis       Clinical information reviewed:   Tobacco  Allergies  Meds   Med Hx  Surg Hx   Fam Hx  Soc Hx        NPO Detail:  NPO/Void Status  Date of Last Liquid: 12/30/23  Time of Last Liquid: 0445  Date of Last Solid: 12/28/23  Time of Last Solid: 2100         Physical Exam    Airway  Mallampati: II     Cardiovascular - normal exam     Dental - normal exam     Pulmonary - normal exam     Abdominal - normal exam             Anesthesia Plan    ASA 3     MAC     The patient is not a current smoker.  Patient did not smoke on day of procedure.  Education provided regarding risk of obstructive sleep apnea.  intravenous induction   Anesthetic plan and risks discussed with patient.

## 2023-12-30 NOTE — DISCHARGE INSTRUCTIONS
Patient Instructions after a Colonoscopy      The anesthetics, sedatives or narcotics which were given to you today will be acting in your body for the next 24 hours, so you might feel a little sleepy or groggy.  This feeling should slowly wear off. Carefully read and follow the instructions.     You received sedation today:  - Do not drive or operate any machinery or power tools of any kind.   - No alcoholic beverages today, not even beer or wine.  - Do not make any important decisions or sign any legal documents.  - No over the counter medications that contain alcohol or that may cause drowsiness.  - Do not make any important decisions or sign any legal documents.    While it is common to experience mild to moderate abdominal distention, gas, or belching after your procedure, if any of these symptoms occur following discharge from the GI Lab or within one week of having your procedure, call the [facility] to be advised whether a visit to your nearest Urgent Care or Emergency Department is indicated.  Take this paper with you if you go.     - If you develop an allergic reaction to the medications that were given during your procedure such as difficulty breathing, rash, hives, severe nausea, vomiting or lightheadedness.  - If you experience chest pain, shortness of breath, severe abdominal pain, fevers and chills.  -If you develop signs and symptoms of bleeding such as blood in your spit, if your stools turn black, tarry, or bloody  - If you have not urinated within 8 hours following your procedure.  - If your IV site becomes painful, red, inflamed, or looks infected.    If you received a biopsy/polypectomy/sphincterotomy the following instructions apply below:    __ Do not use Aspirin containing products, non-steroidal medications or anti-coagulants for one week following your procedure. (Examples of these types of medications are: Advil, Arthrotec, Aleve, Coumadin, Ecotrin, Heparin, Ibuprofen,  Indocin, Motrin, Naprosyn, Nuprin, Plavix, Vioxx, and Voltarin, or their generic forms.  This list is not all-inclusive.  Check with your physician or pharmacist before resuming medications.)   __ Eat a soft diet today.  Avoid foods that are poorly digested for the next 24 hours.  These foods would include: nuts, beans, lettuce, red meats, and fried foods. Start with liquids and advance your diet as tolerated, gradually work up to eating solids.   __ Do not have a Barium Study or Enema for one week.    Your physician recommends the additional following instructions:    -You have a contact number available for emergencies. The signs and symptoms of potential delayed complications were discussed with you. You may return to normal activities tomorrow.  -Resume your previous diet.  -Continue your present medications.   -We are waiting for your pathology results.  -Your physician has recommended a repeat colonoscopy (date to be determined after pending pathology results are reviewed) for surveillance based on pathology results.  -The findings and recommendations have been discussed with you.  -The findings and recommendations were discussed with your family.  - Please see Medication Reconciliation Form for new medication/medications prescribed.       If you experience any problems or have any questions following discharge from the GI Lab, please call:        Nurse Signature                                                                        Date___________________                                                                            Patient/Responsible Party Signature                                        Date___________________

## 2024-01-02 ASSESSMENT — PAIN SCALES - GENERAL: PAINLEVEL_OUTOF10: 0 - NO PAIN

## 2024-01-02 NOTE — ADDENDUM NOTE
Encounter addended by: Victoria Estrella RN on: 1/2/2024 1:52 PM   Actions taken: Contacts section saved, Flowsheet accepted

## 2024-01-04 ENCOUNTER — TELEPHONE (OUTPATIENT)
Dept: SURGERY | Facility: CLINIC | Age: 79
End: 2024-01-04
Payer: MEDICARE

## 2024-01-04 LAB
LABORATORY COMMENT REPORT: NORMAL
PATH REPORT.FINAL DX SPEC: NORMAL
PATH REPORT.GROSS SPEC: NORMAL
PATH REPORT.TOTAL CANCER: NORMAL

## 2024-01-04 NOTE — TELEPHONE ENCOUNTER
----- Message from Gilbert Nguyen MD sent at 1/4/2024 11:35 AM EST -----  Let patient know polyp was removed and nothing to worry about. A my chart reminder will be sent for a repeat colonoscopy in 5  years - this will be optional for him based on his age

## 2024-01-17 DIAGNOSIS — C49.9 SOFT TISSUE SARCOMA (MULTI): Primary | ICD-10-CM

## 2024-01-22 ENCOUNTER — LAB (OUTPATIENT)
Dept: LAB | Facility: CLINIC | Age: 79
End: 2024-01-22
Payer: MEDICARE

## 2024-01-22 ENCOUNTER — HOSPITAL ENCOUNTER (OUTPATIENT)
Dept: RADIOLOGY | Facility: CLINIC | Age: 79
Discharge: HOME | End: 2024-01-22
Payer: MEDICARE

## 2024-01-22 DIAGNOSIS — C49.9 MALIGNANT NEOPLASM OF CONNECTIVE AND SOFT TISSUE, UNSPECIFIED (MULTI): ICD-10-CM

## 2024-01-22 DIAGNOSIS — C49.9 SOFT TISSUE SARCOMA (MULTI): ICD-10-CM

## 2024-01-22 LAB
ALBUMIN SERPL BCP-MCNC: 4.8 G/DL (ref 3.4–5)
ALP SERPL-CCNC: 87 U/L (ref 33–136)
ALT SERPL W P-5'-P-CCNC: 32 U/L (ref 10–52)
ANION GAP SERPL CALC-SCNC: 15 MMOL/L (ref 10–20)
AST SERPL W P-5'-P-CCNC: 21 U/L (ref 9–39)
BASOPHILS # BLD AUTO: 0.06 X10*3/UL (ref 0–0.1)
BASOPHILS NFR BLD AUTO: 0.8 %
BILIRUB SERPL-MCNC: 0.8 MG/DL (ref 0–1.2)
BUN SERPL-MCNC: 16 MG/DL (ref 6–23)
CALCIUM SERPL-MCNC: 9.6 MG/DL (ref 8.6–10.6)
CHLORIDE SERPL-SCNC: 103 MMOL/L (ref 98–107)
CO2 SERPL-SCNC: 26 MMOL/L (ref 21–32)
CREAT SERPL-MCNC: 1.09 MG/DL (ref 0.5–1.3)
CREAT SERPL-MCNC: 1.1 MG/DL (ref 0.6–1.3)
EGFRCR SERPLBLD CKD-EPI 2021: 70 ML/MIN/1.73M*2
EOSINOPHIL # BLD AUTO: 0.19 X10*3/UL (ref 0–0.4)
EOSINOPHIL NFR BLD AUTO: 2.7 %
ERYTHROCYTE [DISTWIDTH] IN BLOOD BY AUTOMATED COUNT: 13 % (ref 11.5–14.5)
GFR SERPL CREATININE-BSD FRML MDRD: 70 ML/MIN/1.73M*2
GLUCOSE SERPL-MCNC: 103 MG/DL (ref 74–99)
HCT VFR BLD AUTO: 45.9 % (ref 41–52)
HGB BLD-MCNC: 15.4 G/DL (ref 13.5–17.5)
IMM GRANULOCYTES # BLD AUTO: 0.02 X10*3/UL (ref 0–0.5)
IMM GRANULOCYTES NFR BLD AUTO: 0.3 % (ref 0–0.9)
LDH SERPL L TO P-CCNC: 139 U/L (ref 84–246)
LYMPHOCYTES # BLD AUTO: 1.24 X10*3/UL (ref 0.8–3)
LYMPHOCYTES NFR BLD AUTO: 17.4 %
MCH RBC QN AUTO: 30.4 PG (ref 26–34)
MCHC RBC AUTO-ENTMCNC: 33.6 G/DL (ref 32–36)
MCV RBC AUTO: 91 FL (ref 80–100)
MONOCYTES # BLD AUTO: 0.65 X10*3/UL (ref 0.05–0.8)
MONOCYTES NFR BLD AUTO: 9.1 %
NEUTROPHILS # BLD AUTO: 4.95 X10*3/UL (ref 1.6–5.5)
NEUTROPHILS NFR BLD AUTO: 69.7 %
NRBC BLD-RTO: NORMAL /100{WBCS}
PLATELET # BLD AUTO: 283 X10*3/UL (ref 150–450)
POTASSIUM SERPL-SCNC: 4.5 MMOL/L (ref 3.5–5.3)
PROT SERPL-MCNC: 7.3 G/DL (ref 6.4–8.2)
RBC # BLD AUTO: 5.06 X10*6/UL (ref 4.5–5.9)
SODIUM SERPL-SCNC: 139 MMOL/L (ref 136–145)
WBC # BLD AUTO: 7.1 X10*3/UL (ref 4.4–11.3)

## 2024-01-22 PROCEDURE — 82565 ASSAY OF CREATININE: CPT | Mod: 59 | Performed by: NURSE PRACTITIONER

## 2024-01-22 PROCEDURE — 36415 COLL VENOUS BLD VENIPUNCTURE: CPT

## 2024-01-22 PROCEDURE — 71260 CT THORAX DX C+: CPT | Performed by: RADIOLOGY

## 2024-01-22 PROCEDURE — 83615 LACTATE (LD) (LDH) ENZYME: CPT | Performed by: NURSE PRACTITIONER

## 2024-01-22 PROCEDURE — 74177 CT ABD & PELVIS W/CONTRAST: CPT

## 2024-01-22 PROCEDURE — 2550000001 HC RX 255 CONTRASTS: Mod: SE | Performed by: NURSE PRACTITIONER

## 2024-01-22 PROCEDURE — 82565 ASSAY OF CREATININE: CPT

## 2024-01-22 PROCEDURE — 74177 CT ABD & PELVIS W/CONTRAST: CPT | Performed by: RADIOLOGY

## 2024-01-22 PROCEDURE — 85025 COMPLETE CBC W/AUTO DIFF WBC: CPT

## 2024-01-22 RX ADMIN — IOHEXOL 75 ML: 350 INJECTION, SOLUTION INTRAVENOUS at 13:17

## 2024-01-26 ENCOUNTER — OFFICE VISIT (OUTPATIENT)
Dept: HEMATOLOGY/ONCOLOGY | Facility: CLINIC | Age: 79
End: 2024-01-26
Payer: MEDICARE

## 2024-01-26 VITALS
BODY MASS INDEX: 27.74 KG/M2 | TEMPERATURE: 97.9 F | OXYGEN SATURATION: 98 % | WEIGHT: 193.34 LBS | HEART RATE: 58 BPM | RESPIRATION RATE: 18 BRPM | SYSTOLIC BLOOD PRESSURE: 143 MMHG | DIASTOLIC BLOOD PRESSURE: 77 MMHG

## 2024-01-26 DIAGNOSIS — C49.9 SOFT TISSUE SARCOMA (MULTI): Primary | ICD-10-CM

## 2024-01-26 DIAGNOSIS — E04.1 THYROID NODULE: ICD-10-CM

## 2024-01-26 PROCEDURE — 3077F SYST BP >= 140 MM HG: CPT | Performed by: NURSE PRACTITIONER

## 2024-01-26 PROCEDURE — 1160F RVW MEDS BY RX/DR IN RCRD: CPT | Performed by: NURSE PRACTITIONER

## 2024-01-26 PROCEDURE — 99215 OFFICE O/P EST HI 40 MIN: CPT | Performed by: NURSE PRACTITIONER

## 2024-01-26 PROCEDURE — 1036F TOBACCO NON-USER: CPT | Performed by: NURSE PRACTITIONER

## 2024-01-26 PROCEDURE — 3078F DIAST BP <80 MM HG: CPT | Performed by: NURSE PRACTITIONER

## 2024-01-26 PROCEDURE — 1159F MED LIST DOCD IN RCRD: CPT | Performed by: NURSE PRACTITIONER

## 2024-01-26 PROCEDURE — 1126F AMNT PAIN NOTED NONE PRSNT: CPT | Performed by: NURSE PRACTITIONER

## 2024-01-26 ASSESSMENT — ENCOUNTER SYMPTOMS
LOSS OF SENSATION IN FEET: 0
HEMATOLOGIC/LYMPHATIC NEGATIVE: 1
CARDIOVASCULAR NEGATIVE: 1
PSYCHIATRIC NEGATIVE: 1
NEUROLOGICAL NEGATIVE: 1
ROS SKIN COMMENTS: VITILIGO
CONSTITUTIONAL NEGATIVE: 1
RESPIRATORY NEGATIVE: 1
OCCASIONAL FEELINGS OF UNSTEADINESS: 0
ARTHRALGIAS: 1
ENDOCRINE NEGATIVE: 1
GASTROINTESTINAL NEGATIVE: 1
DEPRESSION: 0
EYES NEGATIVE: 1

## 2024-01-26 ASSESSMENT — COLUMBIA-SUICIDE SEVERITY RATING SCALE - C-SSRS
6. HAVE YOU EVER DONE ANYTHING, STARTED TO DO ANYTHING, OR PREPARED TO DO ANYTHING TO END YOUR LIFE?: NO
1. IN THE PAST MONTH, HAVE YOU WISHED YOU WERE DEAD OR WISHED YOU COULD GO TO SLEEP AND NOT WAKE UP?: NO
2. HAVE YOU ACTUALLY HAD ANY THOUGHTS OF KILLING YOURSELF?: NO

## 2024-01-26 ASSESSMENT — PATIENT HEALTH QUESTIONNAIRE - PHQ9
1. LITTLE INTEREST OR PLEASURE IN DOING THINGS: NOT AT ALL
2. FEELING DOWN, DEPRESSED OR HOPELESS: NOT AT ALL
SUM OF ALL RESPONSES TO PHQ9 QUESTIONS 1 AND 2: 0

## 2024-01-26 ASSESSMENT — PAIN SCALES - GENERAL: PAINLEVEL: 0-NO PAIN

## 2024-01-26 NOTE — PROGRESS NOTES
".Patient ID: Louie Urbina is a 78 y.o. male.  Referring Physician: No referring provider defined for this encounter.  Primary Care Provider: Desean Tinajero MD     Chief Complaint   Patient presents with    Follow-up     Fuv         HPI  Mr. Urbina presented to  ER on March 8, 2021 with large right lower quadrant mass that was bleeding. CT scan on 03/08/2021 showed a huge mass in the abdominal  wall and multiple lesions in the liver and spleen along with inguinal lymphadenopathy. PET CT on 03/09 lit up in all areas except for liver and spleen. 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 2 lymph nodes  were resected bilaterally and they were positive for cancer as well.  He underwent tissue closure on 03/15/2021. He has had uncomplicated recovery. MDTB discussion done on April 19, 2021- decision to consolidate with post op RT. Started pembrolizumab  on 05/13/2021. RT to right abdominal wall given on 06/02/2021 to 07/06/2021- 4600 Gy ([employer] VMAT), followed by boost between 07/05/2021 to 07/15/2021 (1600 cGy).     He took pembrolizumab 200mg Q3 weeks between May 2021 to Dec 2021. He requested to hold the medication after 10 cycles on account of social issues. CT scan on 11/17/21 did not show any growth in tumor. We ordered a PET scan for Feb 2022 to see if any  lesion is functionally active. PET did not show any evidence of disease. He is currently on surveillance.     Treatment History:    Systemic Therapy:  1. Pembrolizumab 200mg   C1- 05/13/2021 to C9- 10/27/2021  Held treatment after this due to possibility of myositis and extreme fatigue. Also had grade 2-3 transaminitis.  C10- 12/15/2021   Held treatment after this per patient request.     irAE's  Grade 2-3 transaminitis that resolved with Prednisone taper (completed week of 12/13/21).       Subjective   Please refer to \"Notes/Cancer History\" above for complete History of present illness.     Mr Louie ALEJANDRO " Carlitos     - Presents to clinic alone.  - Feels well overall.  - No new issues or concerns.      Review of Systems:   Review of Systems   Constitutional: Negative.    HENT:  Negative.     Eyes: Negative.    Respiratory: Negative.     Cardiovascular: Negative.    Gastrointestinal: Negative.    Endocrine: Negative.    Genitourinary: Negative.     Musculoskeletal:  Positive for arthralgias.   Skin: Negative.         Vitiligo    Neurological: Negative.    Hematological: Negative.    Psychiatric/Behavioral: Negative.           MEDICAL HISTORY  Past Medical History:   Diagnosis Date    Hypertension     Other specified postprocedural states     Status post repair of complex wound       FAMILY HISTORY  No family history on file.    TOBACCO HISTORY  Tobacco Use: Medium Risk (1/26/2024)    Patient History     Smoking Tobacco Use: Former     Smokeless Tobacco Use: Never     Passive Exposure: Not on file       SOCIAL HISTORY  Social Connections: Not on file   , lives with his wife. One son.      Outpatient Medication Profile:  Current Outpatient Medications on File Prior to Visit   Medication Sig Dispense Refill    amLODIPine (Norvasc) 5 mg tablet Take 1 tablet (5 mg) by mouth once daily.      atenolol (Tenormin) 25 mg tablet Take 1 tablet (25 mg) by mouth once daily. 90 tablet 0    multivitamin tablet Take 1 tablet by mouth once daily.      [DISCONTINUED] amLODIPine (Norvasc) 10 mg tablet Take 1 tablet (10 mg) by mouth once daily. (Patient not taking: Reported on 5/4/2023) 90 tablet 1     No current facility-administered medications on file prior to visit.         Performance Status:  Asymptomatic     Vitals and Measurements:   /77   Pulse 58   Temp 36.6 °C (97.9 °F)   Resp 18   Wt 87.7 kg (193 lb 5.5 oz)   SpO2 98%   BMI 27.74 kg/m²       Physical Exam:   Physical Exam  Constitutional:       Appearance: Normal appearance.   HENT:      Head: Normocephalic and atraumatic.      Nose: Nose normal.       Mouth/Throat:      Mouth: Mucous membranes are moist.      Pharynx: Oropharynx is clear.   Eyes:      Conjunctiva/sclera: Conjunctivae normal.   Cardiovascular:      Rate and Rhythm: Normal rate and regular rhythm.      Pulses: Normal pulses.      Heart sounds: Normal heart sounds.   Pulmonary:      Effort: Pulmonary effort is normal.      Breath sounds: Normal breath sounds.   Abdominal:      General: Bowel sounds are normal.      Palpations: Abdomen is soft.   Musculoskeletal:         General: Normal range of motion.      Cervical back: Neck supple.   Skin:     General: Skin is warm and dry.      Comments: Vitiligo    Neurological:      General: No focal deficit present.      Mental Status: He is alert and oriented to person, place, and time.   Psychiatric:         Mood and Affect: Mood normal.         Behavior: Behavior normal.       Lab Results:  I have reviewed these laboratory results:     Lab on 01/22/2024   Component Date Value Ref Range Status    WBC 01/22/2024 7.1  4.4 - 11.3 x10*3/uL Final    nRBC 01/22/2024    Final    RBC 01/22/2024 5.06  4.50 - 5.90 x10*6/uL Final    Hemoglobin 01/22/2024 15.4  13.5 - 17.5 g/dL Final    Hematocrit 01/22/2024 45.9  41.0 - 52.0 % Final    MCV 01/22/2024 91  80 - 100 fL Final    MCH 01/22/2024 30.4  26.0 - 34.0 pg Final    MCHC 01/22/2024 33.6  32.0 - 36.0 g/dL Final    RDW 01/22/2024 13.0  11.5 - 14.5 % Final    Platelets 01/22/2024 283  150 - 450 x10*3/uL Final    Neutrophils % 01/22/2024 69.7  40.0 - 80.0 % Final    Immature Granulocytes %, Automated 01/22/2024 0.3  0.0 - 0.9 % Final    Lymphocytes % 01/22/2024 17.4  13.0 - 44.0 % Final    Monocytes % 01/22/2024 9.1  2.0 - 10.0 % Final    Eosinophils % 01/22/2024 2.7  0.0 - 6.0 % Final    Basophils % 01/22/2024 0.8  0.0 - 2.0 % Final    Neutrophils Absolute 01/22/2024 4.95  1.60 - 5.50 x10*3/uL Final    Immature Granulocytes Absolute, Au* 01/22/2024 0.02  0.00 - 0.50 x10*3/uL Final    Lymphocytes Absolute 01/22/2024  1.24  0.80 - 3.00 x10*3/uL Final    Monocytes Absolute 01/22/2024 0.65  0.05 - 0.80 x10*3/uL Final    Eosinophils Absolute 01/22/2024 0.19  0.00 - 0.40 x10*3/uL Final    Basophils Absolute 01/22/2024 0.06  0.00 - 0.10 x10*3/uL Final    Glucose 01/22/2024 103 (H)  74 - 99 mg/dL Final    Sodium 01/22/2024 139  136 - 145 mmol/L Final    Potassium 01/22/2024 4.5  3.5 - 5.3 mmol/L Final    Chloride 01/22/2024 103  98 - 107 mmol/L Final    Bicarbonate 01/22/2024 26  21 - 32 mmol/L Final    Anion Gap 01/22/2024 15  10 - 20 mmol/L Final    Urea Nitrogen 01/22/2024 16  6 - 23 mg/dL Final    Creatinine 01/22/2024 1.09  0.50 - 1.30 mg/dL Final    eGFR 01/22/2024 70  >60 mL/min/1.73m*2 Final    Calcium 01/22/2024 9.6  8.6 - 10.6 mg/dL Final    Albumin 01/22/2024 4.8  3.4 - 5.0 g/dL Final    Alkaline Phosphatase 01/22/2024 87  33 - 136 U/L Final    Total Protein 01/22/2024 7.3  6.4 - 8.2 g/dL Final    AST 01/22/2024 21  9 - 39 U/L Final    Bilirubin, Total 01/22/2024 0.8  0.0 - 1.2 mg/dL Final    ALT 01/22/2024 32  10 - 52 U/L Final    LDH 01/22/2024 139  84 - 246 U/L Final    POCT Creatinine 01/22/2024 1.1  0.6 - 1.3 mg/dL Final    POCT eGFR 01/22/2024 70  >=60 mL/min/1.73m*2 Final   Hospital Outpatient Visit on 12/30/2023   Component Date Value Ref Range Status    Case Report 12/30/2023    Final                    Value:Surgical Pathology                                Case: [id number]                                  Authorizing Provider:  Gilbert Nguyen MD         Collected:           12/30/2023 0810              Ordering Location:     [facility]   Received:            12/30/2023 4854              Pathologist:           Ginger Eden MD PhD                                                              Specimens:   A) - RECTUM BIOPSY, rectal polyps                                                                   B) - RECTO-SIGMOID BIOPSY, recto-sigmoid polyp-cold snare                                  FINAL  "DIAGNOSIS 12/30/2023    Final                    Value:This result contains rich text formatting which cannot be displayed here.      12/30/2023    Final                    Value:This result contains rich text formatting which cannot be displayed here.    Gross Description 12/30/2023    Final                    Value:This result contains rich text formatting which cannot be displayed here.         Radiology Result:  I have reviewed the latest Imaging in PACS and the findings are noted in this note. I discussed the results of the latest imaging with the patient. All previous imaging were reviewed at the time it was completed. Full records are available in the EMR for review as well.     CT chest abdomen pelvis w IV contrast    Result Date: 1/23/2024  Impression: Body wall pleomorphic sarcoma restaging examination without evident new or progressive metastatic disease.   Mild measurable increase of dominant left lobe thyroid nodule measuring 2.2 cm AP diameter compared with 1.7 cm August 2, 2021. Attention recommended on follow-up assessment. Recommend thyroid ultrasound correlation.   Stable bulge of the abdominal aorta measuring 2.9 cm.   Persistent ventral pelvic wall rim enhancing collection which is slightly measurably improved.   Stable adjacent reticular edema or inflammation and overlying dermal thickening.     Signed by: Tesfaye Cantu 1/23/2024 10:22 AM Dictation workstation:   [id number]        Pathology Results:  I have reviewed the full pathology report recorded in the EMR. The pertinent portions indicating diagnosis are listed here in the note. for details please refer to the full report recorded in the EMR.    Surgical Pathology [Mar 26 2021 10:11AM] ([id number])     Specimens: RIGHT INGUINAL LYMPH NODE /RIGHT GROIN ABDOMINAL SOFT TISSUE MASS /LEFT INGUINAL LYMPH NODE /Received fresh, labeled with the patient's name and hospital number and \"A,/Received fresh, labeled with the patient’s name, " "hospital number,  and/Received fresh, labeled with the patient's name and hospital number and \"C,      Name LOUIE SORENSEN.      Accession #: [id number]   Pathologist: KINSEY MARIA MD   Date of Procedure: 3/10/2021   Date Received: 3/10/2021   Date Reported 3/26/2021   Submitting Physician: NICOLAS MARIE MD   Location: [facility] Other External #      FINAL DIAGNOSIS   A. 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   --SURGICAL MARGINS ARE NEGATIVE FOR MALIGNANCY.      Comment: The tumor is well defined and is comprised of malignant spindle cells with marked pleomorphism, up to 29 mitoses in 10 high-power fields and necrosis involving approximately 15% of tumor volume. The tumor cells show strong diffuse expression  of vimentin with patchy expression of S100 (also stains intratumoral dendritic cells), with patchy EMA expression. The tumor cells are negative for CK AE1/3, CAM 5.2, CD31, CD34, ERG, SMA, desmin, myogenin, SOX10, HMB-45, CD21, CD23, and ALK 1. INI-1  nuclear expression is preserved. MDM 2 by dual-ARELY is not amplified. All surgical margins are negative for malignancy. The closest margin is deep with tumor approximately 0.1 cm away. All other margins are at least 0.5 cm from tumor. Dr. Hayes has reviewed  jc slides and concurs with the diagnosis.      C. LYMPH NODES, LEFT INGUINAL, EXCISION:   --METASTATIC SARCOMA INVOLVING 2 OF 2 LYMPH NODES (2/2).      Electronically Signed Out By KINSEY MARIA MD/STS   By the signature on this report, the individual or group listed as making the Final Interpretation/Diagnosis certifies that they have reviewed this case.      Assessment and Plan:   Assessment/Plan   Mr. Louie Sorensen is a 78 y.o. 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 " This tumor is metastatic to lymph nodes which makes it a stage 4 tumor. Started Pembrolizumab  200mg every 3 weeks on 05/13/21. After 9 cycles the patient reported lethargy and he had a grade 2-3 transaminitis. He was given Prednisone with improvement in liver enzymes. Fatigue remains the same. After 10 cycles of Pembrolizumab, patient elected  to stop treatment d/t side effects and social issues, and opted for observation instead.      # Undifferentiated Pleomorphic sarcoma:  - Currently on observation. CT scan on 01/22/2024 showing overall stable changes. Thyroid nodule increased (1.7-2.2cm). I ordered a thyroid ultrasound. I will call him with the results.   - RTC in 6 months on 07/26/2024.  - CT scan and labs prior 07/22/2024.     # Elevated PSA/prostatomegaly   - He is being followed by urology.     # HTN  - Following with his PCP.      # Smoking cessation  - Tells me he is smoking a few cigarettes daily. He is working on quitting, but denies any assistance with this.      # Health maintenance  - Follows with PCP for wellness visits and age appropriate cancer screenings.      DISCLAIMER:   In preparing for this visit and writing this note, I reviewed all the previous electronic medical records (labs, imaging and medical charts) of the patient available in the physician portal. Significant findings which helped in decision making are recorded  in this chart.     The plan was discussed with the patient. We gave him ample opportunities to ask questions. All questions were answered to his satisfaction and he verbalized understanding.      INSTRUCTIONS FOR PATIENT  Mr. Louie Urbina ,  It was a pleasure talking to you today.    As discussed, we will be conducting surveillance scans in 6 months on 07/22/2024. We will meet again in clinic on 07/26/2024. I will call you with the results of your thyroid ultrasound. Please keep your appointments with dermatology and surgical oncology.     Please make sure to  schedule your appointments as we discussed. Your appointments should also appear in your MyChart.   In case of an emergency please dial 911 or report to your nearest Emergency Room.  For all other questions, please do not hesitate to reach out to us at the number listed below.    Thank you for choosing [facility].   We appreciate your visit.     MD Vivian Montalvo, ENMA [place] RN ([facility] location)  Kacy Saldaña RN ([place] location)    Sarcoma and Cutaneous Oncology team    Phone: [phone]  Fax: [phone].

## 2024-01-30 ENCOUNTER — HOSPITAL ENCOUNTER (OUTPATIENT)
Dept: RADIOLOGY | Facility: HOSPITAL | Age: 79
Discharge: HOME | End: 2024-01-30
Payer: MEDICARE

## 2024-01-30 DIAGNOSIS — C49.9 SOFT TISSUE SARCOMA (MULTI): ICD-10-CM

## 2024-01-30 DIAGNOSIS — E04.1 THYROID NODULE: ICD-10-CM

## 2024-01-30 PROCEDURE — 76536 US EXAM OF HEAD AND NECK: CPT | Performed by: RADIOLOGY

## 2024-01-30 PROCEDURE — 76536 US EXAM OF HEAD AND NECK: CPT

## 2024-02-02 ENCOUNTER — APPOINTMENT (OUTPATIENT)
Dept: RADIOLOGY | Facility: CLINIC | Age: 79
End: 2024-02-02
Payer: MEDICARE

## 2024-02-09 ENCOUNTER — TELEMEDICINE (OUTPATIENT)
Dept: HEMATOLOGY/ONCOLOGY | Facility: CLINIC | Age: 79
End: 2024-02-09
Payer: MEDICARE

## 2024-02-09 DIAGNOSIS — E04.1 THYROID NODULE: ICD-10-CM

## 2024-02-09 DIAGNOSIS — C49.9 SOFT TISSUE SARCOMA (MULTI): ICD-10-CM

## 2024-02-09 PROCEDURE — 1126F AMNT PAIN NOTED NONE PRSNT: CPT | Performed by: NURSE PRACTITIONER

## 2024-02-09 PROCEDURE — 1160F RVW MEDS BY RX/DR IN RCRD: CPT | Performed by: NURSE PRACTITIONER

## 2024-02-09 PROCEDURE — 99443 PR PHYS/QHP TELEPHONE EVALUATION 21-30 MIN: CPT | Performed by: NURSE PRACTITIONER

## 2024-02-09 PROCEDURE — 1159F MED LIST DOCD IN RCRD: CPT | Performed by: NURSE PRACTITIONER

## 2024-02-09 PROCEDURE — 1036F TOBACCO NON-USER: CPT | Performed by: NURSE PRACTITIONER

## 2024-02-09 ASSESSMENT — ENCOUNTER SYMPTOMS
CARDIOVASCULAR NEGATIVE: 1
RESPIRATORY NEGATIVE: 1
PSYCHIATRIC NEGATIVE: 1
ROS SKIN COMMENTS: VITILIGO
CONSTITUTIONAL NEGATIVE: 1
ARTHRALGIAS: 1
GASTROINTESTINAL NEGATIVE: 1
EYES NEGATIVE: 1
ENDOCRINE NEGATIVE: 1
NEUROLOGICAL NEGATIVE: 1
HEMATOLOGIC/LYMPHATIC NEGATIVE: 1

## 2024-02-09 NOTE — PROGRESS NOTES
".Patient ID: Louie Urbina is a 78 y.o. male.  Referring Physician: Vivian Marte, APRN-CNP  [id number] [place] [address]  Primary Care Provider: Desean Tinajero MD     Oncology follow up    HPI  Mr. Urbina presented to  [facility] on March 8, 2021 with large right lower quadrant mass that was bleeding. CT scan on 03/08/2021 showed a huge mass in the abdominal  wall and multiple lesions in the liver and spleen along with inguinal lymphadenopathy. PET CT on 03/09 lit up in all areas except for liver and spleen. 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 2 lymph nodes  were resected bilaterally and they were positive for cancer as well.  He underwent tissue closure on 03/15/2021. He has had uncomplicated recovery. MDTB discussion done on April 19, 2021- decision to consolidate with post op RT. Started pembrolizumab  on 05/13/2021. RT to right abdominal wall given on 06/02/2021 to 07/06/2021- 4600 Gy ([employer] VMAT), followed by boost between 07/05/2021 to 07/15/2021 (1600 cGy).     He took pembrolizumab 200mg Q3 weeks between May 2021 to Dec 2021. He requested to hold the medication after 10 cycles on account of social issues. CT scan on 11/17/21 did not show any growth in tumor. We ordered a PET scan for Feb 2022 to see if any  lesion is functionally active. PET did not show any evidence of disease. He is currently on surveillance.     Treatment History:    Systemic Therapy:  1. Pembrolizumab 200mg   C1- 05/13/2021 to C9- 10/27/2021  Held treatment after this due to possibility of myositis and extreme fatigue. Also had grade 2-3 transaminitis.  C10- 12/15/2021   Held treatment after this per patient request.     irAE's  Grade 2-3 transaminitis that resolved with Prednisone taper (completed week of 12/13/21).       Subjective   Please refer to \"Notes/Cancer History\" above for complete History of present illness.     Mr Louie Urbina     - Feels well "   - No new issues or concerns.   - Wondering about thyroid ultrasound results.      Review of Systems:   Review of Systems   Constitutional: Negative.    HENT:  Negative.     Eyes: Negative.    Respiratory: Negative.     Cardiovascular: Negative.    Gastrointestinal: Negative.    Endocrine: Negative.    Genitourinary: Negative.     Musculoskeletal:  Positive for arthralgias.   Skin: Negative.         Vitiligo    Neurological: Negative.    Hematological: Negative.    Psychiatric/Behavioral: Negative.           MEDICAL HISTORY  Past Medical History:   Diagnosis Date    Hypertension     Other specified postprocedural states     Status post repair of complex wound       FAMILY HISTORY  No family history on file.    TOBACCO HISTORY  Tobacco Use: Medium Risk (1/26/2024)    Patient History     Smoking Tobacco Use: Former     Smokeless Tobacco Use: Never     Passive Exposure: Not on file       SOCIAL HISTORY  Social Connections: Not on file   , lives with his wife. One son.      Outpatient Medication Profile:  Current Outpatient Medications on File Prior to Visit   Medication Sig Dispense Refill    amLODIPine (Norvasc) 5 mg tablet Take 1 tablet (5 mg) by mouth once daily.      atenolol (Tenormin) 25 mg tablet Take 1 tablet (25 mg) by mouth once daily. 90 tablet 0    multivitamin tablet Take 1 tablet by mouth once daily.       No current facility-administered medications on file prior to visit.         Performance Status:  Asymptomatic     Vitals and Measurements:   There were no vitals taken for this visit.   Telephone visit      Physical Exam:   Physical Exam  Telephone visit     Lab Results:  I have reviewed these laboratory results:     Lab on 01/22/2024   Component Date Value Ref Range Status    WBC 01/22/2024 7.1  4.4 - 11.3 x10*3/uL Final    nRBC 01/22/2024    Final    RBC 01/22/2024 5.06  4.50 - 5.90 x10*6/uL Final    Hemoglobin 01/22/2024 15.4  13.5 - 17.5 g/dL Final    Hematocrit 01/22/2024 45.9  41.0 - 52.0  % Final    MCV 01/22/2024 91  80 - 100 fL Final    MCH 01/22/2024 30.4  26.0 - 34.0 pg Final    MCHC 01/22/2024 33.6  32.0 - 36.0 g/dL Final    RDW 01/22/2024 13.0  11.5 - 14.5 % Final    Platelets 01/22/2024 283  150 - 450 x10*3/uL Final    Neutrophils % 01/22/2024 69.7  40.0 - 80.0 % Final    Immature Granulocytes %, Automated 01/22/2024 0.3  0.0 - 0.9 % Final    Lymphocytes % 01/22/2024 17.4  13.0 - 44.0 % Final    Monocytes % 01/22/2024 9.1  2.0 - 10.0 % Final    Eosinophils % 01/22/2024 2.7  0.0 - 6.0 % Final    Basophils % 01/22/2024 0.8  0.0 - 2.0 % Final    Neutrophils Absolute 01/22/2024 4.95  1.60 - 5.50 x10*3/uL Final    Immature Granulocytes Absolute, Au* 01/22/2024 0.02  0.00 - 0.50 x10*3/uL Final    Lymphocytes Absolute 01/22/2024 1.24  0.80 - 3.00 x10*3/uL Final    Monocytes Absolute 01/22/2024 0.65  0.05 - 0.80 x10*3/uL Final    Eosinophils Absolute 01/22/2024 0.19  0.00 - 0.40 x10*3/uL Final    Basophils Absolute 01/22/2024 0.06  0.00 - 0.10 x10*3/uL Final    Glucose 01/22/2024 103 (H)  74 - 99 mg/dL Final    Sodium 01/22/2024 139  136 - 145 mmol/L Final    Potassium 01/22/2024 4.5  3.5 - 5.3 mmol/L Final    Chloride 01/22/2024 103  98 - 107 mmol/L Final    Bicarbonate 01/22/2024 26  21 - 32 mmol/L Final    Anion Gap 01/22/2024 15  10 - 20 mmol/L Final    Urea Nitrogen 01/22/2024 16  6 - 23 mg/dL Final    Creatinine 01/22/2024 1.09  0.50 - 1.30 mg/dL Final    eGFR 01/22/2024 70  >60 mL/min/1.73m*2 Final    Calcium 01/22/2024 9.6  8.6 - 10.6 mg/dL Final    Albumin 01/22/2024 4.8  3.4 - 5.0 g/dL Final    Alkaline Phosphatase 01/22/2024 87  33 - 136 U/L Final    Total Protein 01/22/2024 7.3  6.4 - 8.2 g/dL Final    AST 01/22/2024 21  9 - 39 U/L Final    Bilirubin, Total 01/22/2024 0.8  0.0 - 1.2 mg/dL Final    ALT 01/22/2024 32  10 - 52 U/L Final    LDH 01/22/2024 139  84 - 246 U/L Final    POCT Creatinine 01/22/2024 1.1  0.6 - 1.3 mg/dL Final    POCT eGFR 01/22/2024 70  >=60 mL/min/1.73m*2 Final        "  Radiology Result:  I have reviewed the latest Imaging in PACS and the findings are noted in this note. I discussed the results of the latest imaging with the patient. All previous imaging were reviewed at the time it was completed. Full records are available in the EMR for review as well.     CT chest abdomen pelvis w IV contrast    Result Date: 1/23/2024  Impression: Body wall pleomorphic sarcoma restaging examination without evident new or progressive metastatic disease.   Mild measurable increase of dominant left lobe thyroid nodule measuring 2.2 cm AP diameter compared with 1.7 cm August 2, 2021. Attention recommended on follow-up assessment. Recommend thyroid ultrasound correlation.   Stable bulge of the abdominal aorta measuring 2.9 cm.   Persistent ventral pelvic wall rim enhancing collection which is slightly measurably improved.   Stable adjacent reticular edema or inflammation and overlying dermal thickening.     Signed by: Tesfaye Cantu 1/23/2024 10:22 AM Dictation workstation:   [id number]    ==================================================    STUDY:  US THYROID;  1/30/2024 5:02 pm   IMPRESSION:  Right sided TIRADS 2 and left-sided TIRADS 1 nodules, not requiring  further imaging follow-up or FNA per ACR TIRADS guidelines.     Pathology Results:  I have reviewed the full pathology report recorded in the EMR. The pertinent portions indicating diagnosis are listed here in the note. for details please refer to the full report recorded in the EMR.    Surgical Pathology [Mar 26 2021 10:11AM] ([id number])     Specimens: RIGHT INGUINAL LYMPH NODE /RIGHT GROIN ABDOMINAL SOFT TISSUE MASS /LEFT INGUINAL LYMPH NODE /Received fresh, labeled with the patient's name and hospital number and \"A,/Received fresh, labeled with the patient’s name, hospital number,  and/Received fresh, labeled with the patient's name and hospital number and \"C,      Name NARCISO SORENSENADIMIR FLAVIA.      Accession #: [id number] "   Pathologist: KINSEY MARIA MD   Date of Procedure: 3/10/2021   Date Received: 3/10/2021   Date Reported 3/26/2021   Submitting Physician: NICOLAS MARIE MD   Location: [facility] Other External #      FINAL DIAGNOSIS   A. 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   --SURGICAL MARGINS ARE NEGATIVE FOR MALIGNANCY.      Comment: The tumor is well defined and is comprised of malignant spindle cells with marked pleomorphism, up to 29 mitoses in 10 high-power fields and necrosis involving approximately 15% of tumor volume. The tumor cells show strong diffuse expression  of vimentin with patchy expression of S100 (also stains intratumoral dendritic cells), with patchy EMA expression. The tumor cells are negative for CK AE1/3, CAM 5.2, CD31, CD34, ERG, SMA, desmin, myogenin, SOX10, HMB-45, CD21, CD23, and ALK 1. INI-1  nuclear expression is preserved. MDM 2 by dual-ARELY is not amplified. All surgical margins are negative for malignancy. The closest margin is deep with tumor approximately 0.1 cm away. All other margins are at least 0.5 cm from tumor. Dr. Hayes has reviewed  jc slides and concurs with the diagnosis.      C. LYMPH NODES, LEFT INGUINAL, EXCISION:   --METASTATIC SARCOMA INVOLVING 2 OF 2 LYMPH NODES (2/2).      Electronically Signed Out By KINSEY MARIA MD/STS   By the signature on this report, the individual or group listed as making the Final Interpretation/Diagnosis certifies that they have reviewed this case.      Assessment and Plan:   Assessment/Plan   Mr. Louie Urbina is a 78 y.o. 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 This tumor is metastatic to lymph nodes which makes it a stage 4 tumor. Started Pembrolizumab  200mg every 3 weeks on 05/13/21. After 9 cycles the patient  reported lethargy and he had a grade 2-3 transaminitis. He was given Prednisone with improvement in liver enzymes. Fatigue remains the same. After 10 cycles of Pembrolizumab, patient elected  to stop treatment d/t side effects and social issues, and opted for observation instead.      # Undifferentiated Pleomorphic sarcoma:  - Currently on observation. CT scan on 01/22/2024 showing overall stable changes. Thyroid nodule increased (1.7-2.2cm). I ordered a thyroid ultrasound which showed TIRADS 1 and 2 nodules with no further imaging or follow up needed.   - RTC in 6 months on 07/26/2024.  - CT scan and labs prior 07/22/2024.     # Elevated PSA/prostatomegaly   - He is being followed by urology.     # HTN  - Following with his PCP.      # Smoking cessation  - Tells me he is smoking a few cigarettes daily. He is working on quitting, but denies any assistance with this.      # Health maintenance  - Follows with PCP for wellness visits and age appropriate cancer screenings.      DISCLAIMER:   In preparing for this visit and writing this note, I reviewed all the previous electronic medical records (labs, imaging and medical charts) of the patient available in the physician portal. Significant findings which helped in decision making are recorded  in this chart.     The plan was discussed with the patient. We gave him ample opportunities to ask questions. All questions were answered to his satisfaction and he verbalized understanding.      INSTRUCTIONS FOR PATIENT  Mr. Louie Urbina ,  It was a pleasure talking to you today.    As discussed, we will be conducting surveillance scans in 6 months on 07/22/2024. We will meet again in clinic on 07/26/2024. Please keep your appointments with dermatology and surgical oncology.     Please make sure to schedule your appointments as we discussed. Your appointments should also appear in your MyChart.   In case of an emergency please dial 911 or report to your nearest Emergency  Room.  For all other questions, please do not hesitate to reach out to us at the number listed below.    Thank you for choosing [facility] [place] at [facility].   We appreciate your visit.     MD Vivian Montalvo APRN Taylor Costello, HELLEN ([facility] location)  Kacy Saldaña RN ([facility] location)    Sarcoma and Cutaneous Oncology team    Phone: [phone]  Fax: [phone].

## 2024-03-08 DIAGNOSIS — I10 BENIGN HYPERTENSION: Primary | ICD-10-CM

## 2024-03-08 RX ORDER — AMLODIPINE BESYLATE 5 MG/1
TABLET ORAL
Qty: 90 TABLET | Refills: 0 | Status: SHIPPED | OUTPATIENT
Start: 2024-03-08 | End: 2024-06-06

## 2024-06-06 DIAGNOSIS — I10 BENIGN HYPERTENSION: ICD-10-CM

## 2024-06-06 RX ORDER — AMLODIPINE BESYLATE 5 MG/1
TABLET ORAL
Qty: 90 TABLET | Refills: 0 | Status: SHIPPED | OUTPATIENT
Start: 2024-06-06

## 2024-06-06 RX ORDER — ATENOLOL 25 MG/1
25 TABLET ORAL DAILY
Qty: 90 TABLET | Refills: 0 | Status: SHIPPED | OUTPATIENT
Start: 2024-06-06

## 2024-06-14 ENCOUNTER — OFFICE VISIT (OUTPATIENT)
Dept: PRIMARY CARE | Facility: CLINIC | Age: 79
End: 2024-06-14
Payer: COMMERCIAL

## 2024-06-14 VITALS
HEIGHT: 70 IN | OXYGEN SATURATION: 96 % | WEIGHT: 194.8 LBS | SYSTOLIC BLOOD PRESSURE: 122 MMHG | DIASTOLIC BLOOD PRESSURE: 76 MMHG | BODY MASS INDEX: 27.89 KG/M2 | RESPIRATION RATE: 18 BRPM | HEART RATE: 64 BPM

## 2024-06-14 DIAGNOSIS — J34.89 NASAL DRAINAGE: ICD-10-CM

## 2024-06-14 DIAGNOSIS — J44.9 CHRONIC OBSTRUCTIVE PULMONARY DISEASE, UNSPECIFIED COPD TYPE (MULTI): ICD-10-CM

## 2024-06-14 DIAGNOSIS — M54.50 ACUTE BILATERAL LOW BACK PAIN WITHOUT SCIATICA: ICD-10-CM

## 2024-06-14 PROBLEM — H66.90 ACUTE OTITIS MEDIA: Status: ACTIVE | Noted: 2024-06-14

## 2024-06-14 PROBLEM — G89.18 ACUTE POSTOPERATIVE PAIN: Status: ACTIVE | Noted: 2024-06-14

## 2024-06-14 PROBLEM — R19.5 POSITIVE COLORECTAL CANCER SCREENING USING DNA-BASED STOOL TEST: Status: ACTIVE | Noted: 2024-06-14

## 2024-06-14 PROBLEM — E04.1 THYROID NODULE: Status: ACTIVE | Noted: 2024-01-26

## 2024-06-14 PROCEDURE — 3074F SYST BP LT 130 MM HG: CPT | Performed by: INTERNAL MEDICINE

## 2024-06-14 PROCEDURE — 1125F AMNT PAIN NOTED PAIN PRSNT: CPT | Performed by: INTERNAL MEDICINE

## 2024-06-14 PROCEDURE — 3078F DIAST BP <80 MM HG: CPT | Performed by: INTERNAL MEDICINE

## 2024-06-14 PROCEDURE — 99213 OFFICE O/P EST LOW 20 MIN: CPT | Performed by: INTERNAL MEDICINE

## 2024-06-14 PROCEDURE — 1036F TOBACCO NON-USER: CPT | Performed by: INTERNAL MEDICINE

## 2024-06-14 PROCEDURE — 1160F RVW MEDS BY RX/DR IN RCRD: CPT | Performed by: INTERNAL MEDICINE

## 2024-06-14 PROCEDURE — 1159F MED LIST DOCD IN RCRD: CPT | Performed by: INTERNAL MEDICINE

## 2024-06-14 RX ORDER — PREDNISONE 20 MG/1
40 TABLET ORAL DAILY
Qty: 10 TABLET | Refills: 0 | Status: SHIPPED | OUTPATIENT
Start: 2024-06-14 | End: 2024-06-19

## 2024-06-14 RX ORDER — TIZANIDINE 2 MG/1
2 TABLET ORAL NIGHTLY
Qty: 10 TABLET | Refills: 0 | Status: SHIPPED | OUTPATIENT
Start: 2024-06-14 | End: 2024-06-24

## 2024-06-14 RX ORDER — FLUTICASONE PROPIONATE 50 MCG
1 SPRAY, SUSPENSION (ML) NASAL 2 TIMES DAILY
Qty: 16 G | Refills: 0 | Status: SHIPPED | OUTPATIENT
Start: 2024-06-14 | End: 2024-06-21

## 2024-06-14 ASSESSMENT — ENCOUNTER SYMPTOMS
RHINORRHEA: 1
WEAKNESS: 1
BACK PAIN: 1
COUGH: 1

## 2024-06-14 ASSESSMENT — PAIN SCALES - GENERAL: PAINLEVEL: 3

## 2024-06-14 NOTE — PROGRESS NOTES
"Patient ID:   Louie Urbina is a 79 y.o. 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    Last Office Visit: 2023    Cough  This is a new problem. The current episode started in the past 7 days. The problem has been waxing and waning. The problem occurs every few hours. The cough is Non-productive. Associated symptoms include myalgias, postnasal drip and rhinorrhea.   Back Pain  This is a new problem. The current episode started yesterday. The problem occurs intermittently. The problem has been waxing and waning since onset. The pain is present in the lumbar spine and sacro-iliac. The quality of the pain is described as aching and shooting. The pain is at a severity of 3/10. The pain is moderate. The symptoms are aggravated by coughing. Associated symptoms include weakness. Risk factors include history of cancer. He has tried ice and walking for the symptoms. The treatment provided mild relief.     Social History     Tobacco Use   • Smoking status: Former     Current packs/day: 0.00     Average packs/day: 0.5 packs/day for 40.0 years (20.0 ttl pk-yrs)     Types: Cigarettes     Start date: 1983     Quit date: 2023     Years since quittin.4     Passive exposure: Never   • Smokeless tobacco: Never   Substance Use Topics   • Alcohol use: Yes     Comment: occasional beer- 1 x month      Review of Systems   HENT:  Positive for postnasal drip and rhinorrhea.    Respiratory:  Positive for cough.    Musculoskeletal:  Positive for arthralgias, back pain and myalgias.   Neurological:  Positive for weakness.   All other systems reviewed and are negative.    Visit Vitals  /76   Pulse 64   Resp 18   Ht 1.778 m (5' 10\")   Wt 88.4 kg (194 lb 12.8 oz)   SpO2 96%   BMI 27.95 kg/m² "   Smoking Status Former   BSA 2.09 m²     No Known Allergies     Physical Exam  Constitutional:       Appearance: Normal appearance. He is normal weight.   HENT:      Head: Normocephalic and atraumatic.   Eyes:      Extraocular Movements: Extraocular movements intact.      Pupils: Pupils are equal, round, and reactive to light.   Cardiovascular:      Rate and Rhythm: Normal rate and regular rhythm.      Pulses: Normal pulses.      Heart sounds: Normal heart sounds.   Pulmonary:      Effort: Pulmonary effort is normal.      Breath sounds: Decreased breath sounds present.   Abdominal:      General: Bowel sounds are normal.      Palpations: Abdomen is soft.   Musculoskeletal:         General: Normal range of motion.      Cervical back: Normal range of motion and neck supple.      Thoracic back: Tenderness present.      Lumbar back: Tenderness present.   Skin:     General: Skin is warm and dry.   Neurological:      General: No focal deficit present.      Mental Status: He is alert and oriented to person, place, and time.   Psychiatric:         Mood and Affect: Mood normal.         Behavior: Behavior normal.       Current Outpatient Medications   Medication Instructions   • amLODIPine (Norvasc) 5 mg tablet TAKE ONE TABLET BY MOUTH DAILY. NEED APPOINTMENT FOR FURTHER REFILLS   • atenolol (TENORMIN) 25 mg, oral, Daily   • multivitamin tablet 1 tablet, oral, Daily      Lab Results   Component Value Date    WBC 7.1 01/22/2024    HGB 15.4 01/22/2024    HCT 45.9 01/22/2024     01/22/2024    ALT 32 01/22/2024    AST 21 01/22/2024     01/22/2024    K 4.5 01/22/2024     01/22/2024    CREATININE 1.09 01/22/2024    BUN 16 01/22/2024    CO2 26 01/22/2024    TSH 1.65 06/16/2023    PSA 5.11 (H) 09/25/2023    INR 1.6 (H) 03/10/2021     Assessment/Plan   Problem List Items Addressed This Visit             ICD-10-CM    Acute bilateral low back pain without sciatica M54.50     - start on pred 40mg every day x5d  - start  on zanaflex 2mg at bedtime   - let us know if there is no/minimal improvement         Relevant Medications    predniSONE (Deltasone) 20 mg tablet    tiZANidine (Zanaflex) 2 mg tablet    Nasal drainage J34.89     - start on flonase in both nostrils BID x7d         Relevant Medications    fluticasone (Flonase) 50 mcg/actuation nasal spray     ------  Written by Tamiko Francisco RN, acting as a scribe for Dr. Knox. This note accurately reflects the work and decisions made by Dr. Knox.     I, Dr. Knox, attest all medical record entries made by the scribe were under my direction and were personally dictated by me. I have reviewed the chart and agree that the record accurately reflects my performance of the history, physical exam, and assessment and plan.

## 2024-06-17 PROBLEM — J34.89 NASAL DRAINAGE: Status: ACTIVE | Noted: 2024-06-17

## 2024-06-17 PROBLEM — M54.50 ACUTE BILATERAL LOW BACK PAIN WITHOUT SCIATICA: Status: ACTIVE | Noted: 2024-06-17

## 2024-06-17 ASSESSMENT — ENCOUNTER SYMPTOMS
ARTHRALGIAS: 1
MYALGIAS: 1

## 2024-06-17 NOTE — ASSESSMENT & PLAN NOTE
- start on pred 40mg every day x5d  - start on zanaflex 2mg at bedtime   - let us know if there is no/minimal improvement

## 2024-07-22 ENCOUNTER — LAB (OUTPATIENT)
Dept: LAB | Facility: CLINIC | Age: 79
End: 2024-07-22
Payer: MEDICARE

## 2024-07-22 ENCOUNTER — HOSPITAL ENCOUNTER (OUTPATIENT)
Dept: RADIOLOGY | Facility: CLINIC | Age: 79
Discharge: HOME | End: 2024-07-22
Payer: MEDICARE

## 2024-07-22 DIAGNOSIS — C49.9 SOFT TISSUE SARCOMA (MULTI): ICD-10-CM

## 2024-07-22 LAB
ALBUMIN SERPL BCP-MCNC: 4.3 G/DL (ref 3.4–5)
ALP SERPL-CCNC: 80 U/L (ref 33–136)
ALT SERPL W P-5'-P-CCNC: 26 U/L (ref 10–52)
ANION GAP SERPL CALC-SCNC: 12 MMOL/L (ref 10–20)
AST SERPL W P-5'-P-CCNC: 18 U/L (ref 9–39)
BASOPHILS # BLD AUTO: 0.05 X10*3/UL (ref 0–0.1)
BASOPHILS NFR BLD AUTO: 0.7 %
BILIRUB SERPL-MCNC: 0.5 MG/DL (ref 0–1.2)
BUN SERPL-MCNC: 14 MG/DL (ref 6–23)
CALCIUM SERPL-MCNC: 9.6 MG/DL (ref 8.6–10.6)
CHLORIDE SERPL-SCNC: 103 MMOL/L (ref 98–107)
CO2 SERPL-SCNC: 28 MMOL/L (ref 21–32)
CREAT SERPL-MCNC: 1.2 MG/DL (ref 0.5–1.3)
CREAT SERPL-MCNC: 1.2 MG/DL (ref 0.6–1.3)
EGFRCR SERPLBLD CKD-EPI 2021: 62 ML/MIN/1.73M*2
EOSINOPHIL # BLD AUTO: 0.12 X10*3/UL (ref 0–0.4)
EOSINOPHIL NFR BLD AUTO: 1.7 %
ERYTHROCYTE [DISTWIDTH] IN BLOOD BY AUTOMATED COUNT: 13.2 % (ref 11.5–14.5)
GFR SERPL CREATININE-BSD FRML MDRD: 62 ML/MIN/1.73M*2
GLUCOSE SERPL-MCNC: 109 MG/DL (ref 74–99)
HCT VFR BLD AUTO: 46.8 % (ref 41–52)
HGB BLD-MCNC: 15.3 G/DL (ref 13.5–17.5)
IMM GRANULOCYTES # BLD AUTO: 0.04 X10*3/UL (ref 0–0.5)
IMM GRANULOCYTES NFR BLD AUTO: 0.6 % (ref 0–0.9)
LDH SERPL L TO P-CCNC: 131 U/L (ref 84–246)
LYMPHOCYTES # BLD AUTO: 1.22 X10*3/UL (ref 0.8–3)
LYMPHOCYTES NFR BLD AUTO: 16.9 %
MCH RBC QN AUTO: 29.9 PG (ref 26–34)
MCHC RBC AUTO-ENTMCNC: 32.7 G/DL (ref 32–36)
MCV RBC AUTO: 91 FL (ref 80–100)
MONOCYTES # BLD AUTO: 0.7 X10*3/UL (ref 0.05–0.8)
MONOCYTES NFR BLD AUTO: 9.7 %
NEUTROPHILS # BLD AUTO: 5.11 X10*3/UL (ref 1.6–5.5)
NEUTROPHILS NFR BLD AUTO: 70.4 %
NRBC BLD-RTO: NORMAL /100{WBCS}
PLATELET # BLD AUTO: 301 X10*3/UL (ref 150–450)
POTASSIUM SERPL-SCNC: 4.8 MMOL/L (ref 3.5–5.3)
PROT SERPL-MCNC: 6.8 G/DL (ref 6.4–8.2)
RBC # BLD AUTO: 5.12 X10*6/UL (ref 4.5–5.9)
SODIUM SERPL-SCNC: 138 MMOL/L (ref 136–145)
WBC # BLD AUTO: 7.2 X10*3/UL (ref 4.4–11.3)

## 2024-07-22 PROCEDURE — 74177 CT ABD & PELVIS W/CONTRAST: CPT

## 2024-07-22 PROCEDURE — 74177 CT ABD & PELVIS W/CONTRAST: CPT | Performed by: RADIOLOGY

## 2024-07-22 PROCEDURE — 85025 COMPLETE CBC W/AUTO DIFF WBC: CPT

## 2024-07-22 PROCEDURE — 82565 ASSAY OF CREATININE: CPT | Mod: 59 | Performed by: NURSE PRACTITIONER

## 2024-07-22 PROCEDURE — 71260 CT THORAX DX C+: CPT | Performed by: RADIOLOGY

## 2024-07-22 PROCEDURE — 82565 ASSAY OF CREATININE: CPT

## 2024-07-22 PROCEDURE — 83615 LACTATE (LD) (LDH) ENZYME: CPT | Performed by: NURSE PRACTITIONER

## 2024-07-22 PROCEDURE — 2550000001 HC RX 255 CONTRASTS: Mod: SE | Performed by: NURSE PRACTITIONER

## 2024-07-22 PROCEDURE — 36415 COLL VENOUS BLD VENIPUNCTURE: CPT

## 2024-07-26 ENCOUNTER — OFFICE VISIT (OUTPATIENT)
Dept: HEMATOLOGY/ONCOLOGY | Facility: CLINIC | Age: 79
End: 2024-07-26
Payer: MEDICARE

## 2024-07-26 VITALS
BODY MASS INDEX: 28.77 KG/M2 | WEIGHT: 189.82 LBS | HEART RATE: 62 BPM | RESPIRATION RATE: 18 BRPM | SYSTOLIC BLOOD PRESSURE: 123 MMHG | OXYGEN SATURATION: 100 % | TEMPERATURE: 97.9 F | DIASTOLIC BLOOD PRESSURE: 83 MMHG | HEIGHT: 68 IN

## 2024-07-26 DIAGNOSIS — C49.9 SOFT TISSUE SARCOMA (MULTI): Primary | ICD-10-CM

## 2024-07-26 PROCEDURE — 3074F SYST BP LT 130 MM HG: CPT | Performed by: NURSE PRACTITIONER

## 2024-07-26 PROCEDURE — 1126F AMNT PAIN NOTED NONE PRSNT: CPT | Performed by: NURSE PRACTITIONER

## 2024-07-26 PROCEDURE — 99214 OFFICE O/P EST MOD 30 MIN: CPT | Performed by: NURSE PRACTITIONER

## 2024-07-26 PROCEDURE — 1036F TOBACCO NON-USER: CPT | Performed by: NURSE PRACTITIONER

## 2024-07-26 PROCEDURE — 1159F MED LIST DOCD IN RCRD: CPT | Performed by: NURSE PRACTITIONER

## 2024-07-26 PROCEDURE — 1160F RVW MEDS BY RX/DR IN RCRD: CPT | Performed by: NURSE PRACTITIONER

## 2024-07-26 PROCEDURE — 3079F DIAST BP 80-89 MM HG: CPT | Performed by: NURSE PRACTITIONER

## 2024-07-26 ASSESSMENT — ENCOUNTER SYMPTOMS
GASTROINTESTINAL NEGATIVE: 1
ROS SKIN COMMENTS: VITILIGO
OCCASIONAL FEELINGS OF UNSTEADINESS: 0
DEPRESSION: 0
ARTHRALGIAS: 1
HEMATOLOGIC/LYMPHATIC NEGATIVE: 1
NEUROLOGICAL NEGATIVE: 1
LOSS OF SENSATION IN FEET: 0
EYES NEGATIVE: 1
ENDOCRINE NEGATIVE: 1
PSYCHIATRIC NEGATIVE: 1
RESPIRATORY NEGATIVE: 1
CARDIOVASCULAR NEGATIVE: 1
CONSTITUTIONAL NEGATIVE: 1

## 2024-07-26 ASSESSMENT — PAIN SCALES - GENERAL: PAINLEVEL: 0-NO PAIN

## 2024-07-26 NOTE — PROGRESS NOTES
".Patient ID: Louie Urbina is a 79 y.o. male.  Referring Physician: Vivian Marte, APRN-CNP  [id number] [place] [address]  Primary Care Provider: Desean Tinajero MD     Oncology follow up    HPI  Mr. Urbina presented to  ER on March 8, 2021 with large right lower quadrant mass that was bleeding. CT scan on 03/08/2021 showed a huge mass in the abdominal  wall and multiple lesions in the liver and spleen along with inguinal lymphadenopathy. PET CT on 03/09 lit up in all areas except for liver and spleen. 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 2 lymph nodes  were resected bilaterally and they were positive for cancer as well.  He underwent tissue closure on 03/15/2021. He has had uncomplicated recovery. MDTB discussion done on April 19, 2021- decision to consolidate with post op RT. Started pembrolizumab  on 05/13/2021. RT to right abdominal wall given on 06/02/2021 to 07/06/2021- 4600 Gy ([employer] VMAT), followed by boost between 07/05/2021 to 07/15/2021 (1600 cGy).     He took pembrolizumab 200mg Q3 weeks between May 2021 to Dec 2021. He requested to hold the medication after 10 cycles on account of social issues. CT scan on 11/17/21 did not show any growth in tumor. We ordered a PET scan for Feb 2022 to see if any  lesion is functionally active. PET did not show any evidence of disease. He is currently on surveillance.     Treatment History:    Systemic Therapy:  1. Pembrolizumab 200mg   C1- 05/13/2021 to C9- 10/27/2021  Held treatment after this due to possibility of myositis and extreme fatigue. Also had grade 2-3 transaminitis.  C10- 12/15/2021   Held treatment after this per patient request.     irAE's  Grade 2-3 transaminitis that resolved with Prednisone taper (completed week of 12/13/21).       Subjective   Please refer to \"Notes/Cancer History\" above for complete History of present illness.     Mr Louie Urbina     - Presents " to clinic alone.   - Feels well.   - No new issues or concerns.      Review of Systems:   Review of Systems   Constitutional: Negative.    HENT:  Negative.     Eyes: Negative.    Respiratory: Negative.     Cardiovascular: Negative.    Gastrointestinal: Negative.    Endocrine: Negative.    Genitourinary: Negative.     Musculoskeletal:  Positive for arthralgias.        B/l hands   Skin: Negative.         Vitiligo    Neurological: Negative.    Hematological: Negative.    Psychiatric/Behavioral: Negative.           MEDICAL HISTORY  Past Medical History:   Diagnosis Date    Hypertension     Other specified postprocedural states     Status post repair of complex wound       FAMILY HISTORY  No family history on file.    TOBACCO HISTORY  Tobacco Use: Medium Risk (6/18/2024)    Patient History     Smoking Tobacco Use: Former     Smokeless Tobacco Use: Never     Passive Exposure: Never       SOCIAL HISTORY  Social Connections: Not on file   , lives with his wife. One son.      Outpatient Medication Profile:  Current Outpatient Medications on File Prior to Visit   Medication Sig Dispense Refill    amLODIPine (Norvasc) 5 mg tablet TAKE ONE TABLET BY MOUTH DAILY. NEED APPOINTMENT FOR FURTHER REFILLS 90 tablet 0    atenolol (Tenormin) 25 mg tablet TAKE ONE TABLET BY MOUTH ONCE DAILY 90 tablet 0    fluticasone (Flonase) 50 mcg/actuation nasal spray Administer 1 spray into each nostril 2 times a day for 7 days. Shake gently. Before first use, prime pump. After use, clean tip and replace cap. 16 g 0    multivitamin tablet Take 1 tablet by mouth once daily.      tiZANidine (Zanaflex) 2 mg tablet Take 1 tablet (2 mg) by mouth once daily at bedtime for 10 days. 10 tablet 0     No current facility-administered medications on file prior to visit.         Performance Status:  Asymptomatic     Vitals and Measurements:   Vitals:    07/26/24 1331   BP: 123/83   Pulse: 62   Resp: 18   Temp: 36.6 °C (97.9 °F)   SpO2: 100%   Weight: 86.1  "kg (189 lb 13.1 oz)   Height: (S) 1.737 m (5' 8.39\")   PainSc: 0-No pain        Physical Exam:   Physical Exam  Constitutional:       Appearance: Normal appearance.   HENT:      Head: Normocephalic and atraumatic.      Nose: Nose normal.      Mouth/Throat:      Mouth: Mucous membranes are moist.      Pharynx: Oropharynx is clear.   Eyes:      Conjunctiva/sclera: Conjunctivae normal.   Cardiovascular:      Rate and Rhythm: Normal rate and regular rhythm.      Pulses: Normal pulses.      Heart sounds: Normal heart sounds.   Pulmonary:      Effort: Pulmonary effort is normal.      Breath sounds: Normal breath sounds.   Abdominal:      General: Bowel sounds are normal.      Palpations: Abdomen is soft.      Comments: Healed abdominal incision   Musculoskeletal:         General: Normal range of motion.      Cervical back: Neck supple.   Skin:     General: Skin is warm and dry.   Neurological:      General: No focal deficit present.      Mental Status: He is alert and oriented to person, place, and time.   Psychiatric:         Mood and Affect: Mood normal.         Behavior: Behavior normal.         Lab Results:  I have reviewed these laboratory results:     Lab on 07/22/2024   Component Date Value Ref Range Status    WBC 07/22/2024 7.2  4.4 - 11.3 x10*3/uL Final    nRBC 07/22/2024    Final    RBC 07/22/2024 5.12  4.50 - 5.90 x10*6/uL Final    Hemoglobin 07/22/2024 15.3  13.5 - 17.5 g/dL Final    Hematocrit 07/22/2024 46.8  41.0 - 52.0 % Final    MCV 07/22/2024 91  80 - 100 fL Final    MCH 07/22/2024 29.9  26.0 - 34.0 pg Final    MCHC 07/22/2024 32.7  32.0 - 36.0 g/dL Final    RDW 07/22/2024 13.2  11.5 - 14.5 % Final    Platelets 07/22/2024 301  150 - 450 x10*3/uL Final    Neutrophils % 07/22/2024 70.4  40.0 - 80.0 % Final    Immature Granulocytes %, Automated 07/22/2024 0.6  0.0 - 0.9 % Final    Lymphocytes % 07/22/2024 16.9  13.0 - 44.0 % Final    Monocytes % 07/22/2024 9.7  2.0 - 10.0 % Final    Eosinophils % 07/22/2024 " 1.7  0.0 - 6.0 % Final    Basophils % 07/22/2024 0.7  0.0 - 2.0 % Final    Neutrophils Absolute 07/22/2024 5.11  1.60 - 5.50 x10*3/uL Final    Immature Granulocytes Absolute, Au* 07/22/2024 0.04  0.00 - 0.50 x10*3/uL Final    Lymphocytes Absolute 07/22/2024 1.22  0.80 - 3.00 x10*3/uL Final    Monocytes Absolute 07/22/2024 0.70  0.05 - 0.80 x10*3/uL Final    Eosinophils Absolute 07/22/2024 0.12  0.00 - 0.40 x10*3/uL Final    Basophils Absolute 07/22/2024 0.05  0.00 - 0.10 x10*3/uL Final    Glucose 07/22/2024 109 (H)  74 - 99 mg/dL Final    Sodium 07/22/2024 138  136 - 145 mmol/L Final    Potassium 07/22/2024 4.8  3.5 - 5.3 mmol/L Final    Chloride 07/22/2024 103  98 - 107 mmol/L Final    Bicarbonate 07/22/2024 28  21 - 32 mmol/L Final    Anion Gap 07/22/2024 12  10 - 20 mmol/L Final    Urea Nitrogen 07/22/2024 14  6 - 23 mg/dL Final    Creatinine 07/22/2024 1.20  0.50 - 1.30 mg/dL Final    eGFR 07/22/2024 62  >60 mL/min/1.73m*2 Final    Calcium 07/22/2024 9.6  8.6 - 10.6 mg/dL Final    Albumin 07/22/2024 4.3  3.4 - 5.0 g/dL Final    Alkaline Phosphatase 07/22/2024 80  33 - 136 U/L Final    Total Protein 07/22/2024 6.8  6.4 - 8.2 g/dL Final    AST 07/22/2024 18  9 - 39 U/L Final    Bilirubin, Total 07/22/2024 0.5  0.0 - 1.2 mg/dL Final    ALT 07/22/2024 26  10 - 52 U/L Final    LDH 07/22/2024 131  84 - 246 U/L Final    POCT Creatinine 07/22/2024 1.20  0.60 - 1.30 mg/dL Final    POCT eGFR 07/22/2024 62  >=60 mL/min/1.73m*2 Final         Radiology Result:  I have reviewed the latest Imaging in PACS and the findings are noted in this note. I discussed the results of the latest imaging with the patient. All previous imaging were reviewed at the time it was completed. Full records are available in the EMR for review as well.     CT CHEST ABDOMEN PELVIS W IV CONTRAST; 7/22/2024 2:01 pm   IMPRESSION:  Soft tissue sarcoma restaging scan. When compared to the prior  examination dated 01/22/2024, there has been no significant  "interval  change and no definite evidence of new metastatic disease. Additional  stable chronic and incidental findings as above.  ==================================================  STUDY:  US THYROID;  1/30/2024 5:02 pm   IMPRESSION:  Right sided TIRADS 2 and left-sided TIRADS 1 nodules, not requiring  further imaging follow-up or FNA per ACR TIRADS guidelines.     Pathology Results:  I have reviewed the full pathology report recorded in the EMR. The pertinent portions indicating diagnosis are listed here in the note. for details please refer to the full report recorded in the EMR.    Surgical Pathology [Mar 26 2021 10:11AM] ([id number])     Specimens: RIGHT INGUINAL LYMPH NODE /RIGHT GROIN ABDOMINAL SOFT TISSUE MASS /LEFT INGUINAL LYMPH NODE /Received fresh, labeled with the patient's name and hospital number and \"A,/Received fresh, labeled with the patient’s name, hospital number,  and/Received fresh, labeled with the patient's name and hospital number and \"C,      Name CARMEN SORENSEN      Accession #: [id number]   Pathologist: KINSEY MARIA MD   Date of Procedure: 3/10/2021   Date Received: 3/10/2021   Date Reported 3/26/2021   Submitting Physician: NICOLAS MARIE MD   Location: OR Other External #      FINAL DIAGNOSIS   A. 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   --SURGICAL MARGINS ARE NEGATIVE FOR MALIGNANCY.      Comment: The tumor is well defined and is comprised of malignant spindle cells with marked pleomorphism, up to 29 mitoses in 10 high-power fields and necrosis involving approximately 15% of tumor volume. The tumor cells show strong diffuse expression  of vimentin with patchy expression of S100 (also stains intratumoral dendritic cells), with patchy EMA expression. The tumor cells are negative for CK AE1/3, CAM " 5.2, CD31, CD34, ERG, SMA, desmin, myogenin, SOX10, HMB-45, CD21, CD23, and ALK 1. INI-1  nuclear expression is preserved. MDM 2 by dual-ARELY is not amplified. All surgical margins are negative for malignancy. The closest margin is deep with tumor approximately 0.1 cm away. All other margins are at least 0.5 cm from tumor. Dr. Hayes has reviewed  jc slides and concurs with the diagnosis.      C. LYMPH NODES, LEFT INGUINAL, EXCISION:   --METASTATIC SARCOMA INVOLVING 2 OF 2 LYMPH NODES (2/2).      Electronically Signed Out By KINSEY MARIA MD/STS   By the signature on this report, the individual or group listed as making the Final Interpretation/Diagnosis certifies that they have reviewed this case.      Assessment and Plan:   Assessment/Plan   Mr. Louie Urbina is a 79 y.o. 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 This tumor is metastatic to lymph nodes which makes it a stage 4 tumor. Started Pembrolizumab  200mg every 3 weeks on 05/13/21. After 9 cycles the patient reported lethargy and he had a grade 2-3 transaminitis. He was given Prednisone with improvement in liver enzymes. Fatigue remains the same. After 10 cycles of Pembrolizumab, patient elected  to stop treatment d/t side effects and social issues, and opted for observation instead.      # Undifferentiated Pleomorphic sarcoma:  - Currently on observation.   - CT scan on 07/22/2024 showing stable changes and TANIYA.   - RTC in 6 months on 01/31/2025.  - CT scan and labs prior 01/27/2025.     # Elevated PSA/prostatomegaly   - He is being followed by urology.     # HTN  - Following with his PCP.      # Smoking cessation  - Tells me he is smoking a few cigarettes daily. He is working on quitting, but denies any assistance with this.      # Health maintenance  - Follows with PCP for wellness visits and age appropriate cancer screenings.      DISCLAIMER:   In preparing for this visit and writing this note, I  reviewed all the previous electronic medical records (labs, imaging and medical charts) of the patient available in the physician portal. Significant findings which helped in decision making are recorded  in this chart.     The plan was discussed with the patient. We gave him ample opportunities to ask questions. All questions were answered to his satisfaction and he verbalized understanding.      INSTRUCTIONS FOR PATIENT  Mr. Louie Urbina ,  It was a pleasure talking to you today.    As discussed, we will be conducting surveillance scans in 6 months on 01/27/2025. We will meet again in clinic on 01/31/2025. Please keep your appointments with dermatology and surgical oncology.     Please make sure to schedule your appointments as we discussed. Your appointments should also appear in your MyChart.   In case of an emergency please dial 911 or report to your nearest Emergency Room.  For all other questions, please do not hesitate to reach out to us at the number listed below.    Thank you for choosing [facility] at [facility].   We appreciate your visit.     MD Vivian Montavlo APRN Taylor Costello, RN ([place])    Sarcoma and Cutaneous Oncology team    Phone: [phone]  Fax: [phone].

## 2024-09-07 DIAGNOSIS — I10 BENIGN HYPERTENSION: ICD-10-CM

## 2024-09-09 RX ORDER — AMLODIPINE BESYLATE 5 MG/1
TABLET ORAL
Qty: 90 TABLET | Refills: 0 | Status: SHIPPED | OUTPATIENT
Start: 2024-09-09

## 2024-09-09 RX ORDER — ATENOLOL 25 MG/1
25 TABLET ORAL DAILY
Qty: 90 TABLET | Refills: 0 | Status: SHIPPED | OUTPATIENT
Start: 2024-09-09

## 2024-12-13 DIAGNOSIS — I10 BENIGN HYPERTENSION: ICD-10-CM

## 2024-12-13 RX ORDER — ATENOLOL 25 MG/1
25 TABLET ORAL DAILY
Qty: 90 TABLET | Refills: 0 | Status: SHIPPED | OUTPATIENT
Start: 2024-12-13

## 2024-12-13 RX ORDER — AMLODIPINE BESYLATE 5 MG/1
TABLET ORAL
Qty: 90 TABLET | Refills: 0 | Status: SHIPPED | OUTPATIENT
Start: 2024-12-13

## 2025-01-24 ENCOUNTER — LAB (OUTPATIENT)
Dept: LAB | Facility: LAB | Age: 80
End: 2025-01-24
Payer: MEDICARE

## 2025-01-24 DIAGNOSIS — C49.9 SOFT TISSUE SARCOMA (MULTI): ICD-10-CM

## 2025-01-24 LAB
ALBUMIN SERPL BCP-MCNC: 4.4 G/DL (ref 3.4–5)
ALP SERPL-CCNC: 84 U/L (ref 33–136)
ALT SERPL W P-5'-P-CCNC: 20 U/L (ref 10–52)
ANION GAP SERPL CALC-SCNC: 11 MMOL/L (ref 10–20)
AST SERPL W P-5'-P-CCNC: 16 U/L (ref 9–39)
BASOPHILS # BLD AUTO: 0.08 X10*3/UL (ref 0–0.1)
BASOPHILS NFR BLD AUTO: 1.2 %
BILIRUB SERPL-MCNC: 0.4 MG/DL (ref 0–1.2)
BUN SERPL-MCNC: 18 MG/DL (ref 6–23)
CALCIUM SERPL-MCNC: 9.5 MG/DL (ref 8.6–10.3)
CHLORIDE SERPL-SCNC: 107 MMOL/L (ref 98–107)
CO2 SERPL-SCNC: 28 MMOL/L (ref 21–32)
CREAT SERPL-MCNC: 1.17 MG/DL (ref 0.5–1.3)
EGFRCR SERPLBLD CKD-EPI 2021: 64 ML/MIN/1.73M*2
EOSINOPHIL # BLD AUTO: 0.16 X10*3/UL (ref 0–0.4)
EOSINOPHIL NFR BLD AUTO: 2.3 %
ERYTHROCYTE [DISTWIDTH] IN BLOOD BY AUTOMATED COUNT: 13.7 % (ref 11.5–14.5)
GLUCOSE SERPL-MCNC: 103 MG/DL (ref 74–99)
HCT VFR BLD AUTO: 46.5 % (ref 41–52)
HGB BLD-MCNC: 15 G/DL (ref 13.5–17.5)
IMM GRANULOCYTES # BLD AUTO: 0.04 X10*3/UL (ref 0–0.5)
IMM GRANULOCYTES NFR BLD AUTO: 0.6 % (ref 0–0.9)
LDH SERPL L TO P-CCNC: 130 U/L (ref 84–246)
LYMPHOCYTES # BLD AUTO: 1.09 X10*3/UL (ref 0.8–3)
LYMPHOCYTES NFR BLD AUTO: 15.7 %
MCH RBC QN AUTO: 29.9 PG (ref 26–34)
MCHC RBC AUTO-ENTMCNC: 32.3 G/DL (ref 32–36)
MCV RBC AUTO: 93 FL (ref 80–100)
MONOCYTES # BLD AUTO: 0.73 X10*3/UL (ref 0.05–0.8)
MONOCYTES NFR BLD AUTO: 10.5 %
NEUTROPHILS # BLD AUTO: 4.83 X10*3/UL (ref 1.6–5.5)
NEUTROPHILS NFR BLD AUTO: 69.7 %
NRBC BLD-RTO: 0 /100 WBCS (ref 0–0)
PLATELET # BLD AUTO: 364 X10*3/UL (ref 150–450)
POTASSIUM SERPL-SCNC: 4.6 MMOL/L (ref 3.5–5.3)
PROT SERPL-MCNC: 7.1 G/DL (ref 6.4–8.2)
RBC # BLD AUTO: 5.02 X10*6/UL (ref 4.5–5.9)
SODIUM SERPL-SCNC: 141 MMOL/L (ref 136–145)
WBC # BLD AUTO: 6.9 X10*3/UL (ref 4.4–11.3)

## 2025-01-27 ENCOUNTER — HOSPITAL ENCOUNTER (OUTPATIENT)
Dept: RADIOLOGY | Facility: HOSPITAL | Age: 80
Discharge: HOME | End: 2025-01-27
Payer: MEDICARE

## 2025-01-27 DIAGNOSIS — C49.9 SOFT TISSUE SARCOMA (MULTI): ICD-10-CM

## 2025-01-27 PROCEDURE — 74177 CT ABD & PELVIS W/CONTRAST: CPT

## 2025-01-27 PROCEDURE — 74177 CT ABD & PELVIS W/CONTRAST: CPT | Performed by: RADIOLOGY

## 2025-01-27 PROCEDURE — 2550000001 HC RX 255 CONTRASTS: Performed by: NURSE PRACTITIONER

## 2025-01-27 PROCEDURE — 71260 CT THORAX DX C+: CPT | Performed by: RADIOLOGY

## 2025-01-27 RX ADMIN — IOHEXOL 75 ML: 350 INJECTION, SOLUTION INTRAVENOUS at 14:01

## 2025-01-31 ENCOUNTER — APPOINTMENT (OUTPATIENT)
Dept: HEMATOLOGY/ONCOLOGY | Facility: CLINIC | Age: 80
End: 2025-01-31
Payer: MEDICARE

## 2025-01-31 ENCOUNTER — APPOINTMENT (OUTPATIENT)
Dept: HEMATOLOGY/ONCOLOGY | Facility: CLINIC | Age: 80
End: 2025-01-31
Payer: COMMERCIAL

## 2025-02-12 ENCOUNTER — TELEMEDICINE (OUTPATIENT)
Dept: HEMATOLOGY/ONCOLOGY | Facility: CLINIC | Age: 80
End: 2025-02-12
Payer: MEDICARE

## 2025-02-12 DIAGNOSIS — C49.9 UNDIFFERENTIATED PLEOMORPHIC SARCOMA (MULTI): Primary | ICD-10-CM

## 2025-02-12 PROBLEM — D48.5 NEOPLASM OF UNCERTAIN BEHAVIOR OF SKIN: Status: RESOLVED | Noted: 2023-08-01 | Resolved: 2025-02-12

## 2025-02-12 ASSESSMENT — ENCOUNTER SYMPTOMS
SCLERAL ICTERUS: 0
DIFFICULTY URINATING: 0
CHILLS: 0
SORE THROAT: 0
HEMATURIA: 0
FEVER: 0
NERVOUS/ANXIOUS: 0
HEMOPTYSIS: 0
SEIZURES: 0
LIGHT-HEADEDNESS: 0
DIARRHEA: 0
SHORTNESS OF BREATH: 0
EYE PROBLEMS: 0
BRUISES/BLEEDS EASILY: 0
DYSURIA: 0
NAUSEA: 0
CONFUSION: 0
CONSTIPATION: 0
BACK PAIN: 0
DEPRESSION: 0
VOMITING: 0
NUMBNESS: 0
TROUBLE SWALLOWING: 0
CHEST TIGHTNESS: 0
MYALGIAS: 0
VOICE CHANGE: 0
ARTHRALGIAS: 0
BLOOD IN STOOL: 0
APPETITE CHANGE: 0
ADENOPATHY: 0
FATIGUE: 0
DIZZINESS: 0
NECK PAIN: 0
LEG SWELLING: 0

## 2025-02-12 NOTE — PROGRESS NOTES
.Patient ID: Louie Urbina is a 79 y.o. male.  Referring Physician: No referring provider defined for this encounter.  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      5/13/2021 - 12/15/2021 Immunotherapy    Pembrolizumab 200mg IV Q3 weeks        Mr. Louie rUbina presented to  [facility] on March 8, 2021 with large right lower quadrant mass that was bleeding. CT scan on 03/08/2021 showed a huge mass in the abdominal  wall and multiple lesions in the liver and spleen along with inguinal lymphadenopathy. PET CT on 03/09 lit up in all areas except for liver and spleen. 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 2 lymph nodes  were resected bilaterally and they were positive for cancer as well.  He underwent tissue closure on 03/15/2021. He has had uncomplicated recovery. MDTB discussion done on April 19, 2021- decision to consolidate with post op RT. Started pembrolizumab  on 05/13/2021. RT to right abdominal wall given on 06/02/2021 to 07/06/2021- 4600 Gy ([employer] VMAT), followed by boost between 07/05/2021 to 07/15/2021 (1600 cGy).     He took pembrolizumab 200mg Q3 weeks between May 2021 to Dec 2021. He requested to hold the medication after 10 cycles on account of social issues. CT scan on 11/17/21 did not show any growth in tumor. We ordered a PET scan for Feb 2022 to see if any  lesion is functionally active. PET did not  "show any evidence of disease. He is currently on surveillance.      Treatment History:   Systemic Therapy:  1. Pembrolizumab 200mg   C1- 05/13/2021 to C9- 10/27/2021  Held treatment after this due to possibility of myositis and extreme fatigue. Also had grade 2-3 transaminitis.  C10- 12/15/2021   Held treatment after this per patient request.     irAE's  Grade 2-3 transaminitis that resolved with Prednisone taper (completed week of 12/13/21).         Subjective   Please refer to \"Notes/Cancer History\" above for complete History of present illness.     I had a phone visit with Mr. Louie Urbina today. I spoke directly with the patient. Verbal consent was obtained to conduct this phone visit.      Mr. Urbina is doing well. Wants to review the CT scan reports     Review of Systems:   Review of Systems   Constitutional:  Negative for appetite change, chills, fatigue and fever.   HENT:   Negative for hearing loss, mouth sores, sore throat, trouble swallowing and voice change.    Eyes:  Negative for eye problems and icterus.   Respiratory:  Negative for chest tightness, hemoptysis and shortness of breath.    Cardiovascular:  Negative for chest pain and leg swelling.   Gastrointestinal:  Negative for blood in stool, constipation, diarrhea, nausea and vomiting.   Genitourinary:  Negative for difficulty urinating, dysuria, hematuria, nocturia and pelvic pain.    Musculoskeletal:  Negative for arthralgias, back pain, gait problem, myalgias and neck pain.   Skin:  Negative for itching and rash.   Neurological:  Negative for dizziness, gait problem, light-headedness, numbness and seizures.   Hematological:  Negative for adenopathy. Does not bruise/bleed easily.   Psychiatric/Behavioral:  Negative for confusion and depression. The patient is not nervous/anxious.          MEDICAL HISTORY  Past Medical History:   Diagnosis Date    Hypertension     Other specified postprocedural states     Status post repair of complex wound "    Retroperitoneal sarcoma (Multi)     Undifferentiated pleomorphic sarcoma (Multi)        FAMILY HISTORY  Family History   Problem Relation Name Age of Onset    No Known Problems Mother      No Known Problems Father      No Known Problems Sister         TOBACCO HISTORY  Tobacco Use: Medium Risk (7/26/2024)    Patient History     Smoking Tobacco Use: Former     Smokeless Tobacco Use: Never     Passive Exposure: Never       SOCIAL HISTORY  Social Connections: Not on file        Outpatient Medication Profile:  Current Outpatient Medications on File Prior to Visit   Medication Sig Dispense Refill    amLODIPine (Norvasc) 5 mg tablet TAKE ONE TABLET BY MOUTH once DAILY *need appointment for further refills* 90 tablet 0    atenolol (Tenormin) 25 mg tablet TAKE ONE TABLET BY MOUTH once DAILY 90 tablet 0    multivitamin tablet Take 1 tablet by mouth once daily.       No current facility-administered medications on file prior to visit.         Performance Status:  Asymptomatic     Vitals and Measurements:   There were no vitals taken for this visit.      Physical Exam:   Physical Exam         Lab Results:  I have reviewed these laboratory results:     Lab on 01/24/2025   Component Date Value Ref Range Status    WBC 01/24/2025 6.9  4.4 - 11.3 x10*3/uL Final    nRBC 01/24/2025 0.0  0.0 - 0.0 /100 WBCs Final    RBC 01/24/2025 5.02  4.50 - 5.90 x10*6/uL Final    Hemoglobin 01/24/2025 15.0  13.5 - 17.5 g/dL Final    Hematocrit 01/24/2025 46.5  41.0 - 52.0 % Final    MCV 01/24/2025 93  80 - 100 fL Final    MCH 01/24/2025 29.9  26.0 - 34.0 pg Final    MCHC 01/24/2025 32.3  32.0 - 36.0 g/dL Final    RDW 01/24/2025 13.7  11.5 - 14.5 % Final    Platelets 01/24/2025 364  150 - 450 x10*3/uL Final    Neutrophils % 01/24/2025 69.7  40.0 - 80.0 % Final    Immature Granulocytes %, Automated 01/24/2025 0.6  0.0 - 0.9 % Final    Lymphocytes % 01/24/2025 15.7  13.0 - 44.0 % Final    Monocytes % 01/24/2025 10.5  2.0 - 10.0 % Final     Eosinophils % 01/24/2025 2.3  0.0 - 6.0 % Final    Basophils % 01/24/2025 1.2  0.0 - 2.0 % Final    Neutrophils Absolute 01/24/2025 4.83  1.60 - 5.50 x10*3/uL Final    Immature Granulocytes Absolute, Au* 01/24/2025 0.04  0.00 - 0.50 x10*3/uL Final    Lymphocytes Absolute 01/24/2025 1.09  0.80 - 3.00 x10*3/uL Final    Monocytes Absolute 01/24/2025 0.73  0.05 - 0.80 x10*3/uL Final    Eosinophils Absolute 01/24/2025 0.16  0.00 - 0.40 x10*3/uL Final    Basophils Absolute 01/24/2025 0.08  0.00 - 0.10 x10*3/uL Final    Glucose 01/24/2025 103 (H)  74 - 99 mg/dL Final    Sodium 01/24/2025 141  136 - 145 mmol/L Final    Potassium 01/24/2025 4.6  3.5 - 5.3 mmol/L Final    Chloride 01/24/2025 107  98 - 107 mmol/L Final    Bicarbonate 01/24/2025 28  21 - 32 mmol/L Final    Anion Gap 01/24/2025 11  10 - 20 mmol/L Final    Urea Nitrogen 01/24/2025 18  6 - 23 mg/dL Final    Creatinine 01/24/2025 1.17  0.50 - 1.30 mg/dL Final    eGFR 01/24/2025 64  >60 mL/min/1.73m*2 Final    Calcium 01/24/2025 9.5  8.6 - 10.3 mg/dL Final    Albumin 01/24/2025 4.4  3.4 - 5.0 g/dL Final    Alkaline Phosphatase 01/24/2025 84  33 - 136 U/L Final    Total Protein 01/24/2025 7.1  6.4 - 8.2 g/dL Final    AST 01/24/2025 16  9 - 39 U/L Final    Bilirubin, Total 01/24/2025 0.4  0.0 - 1.2 mg/dL Final    ALT 01/24/2025 20  10 - 52 U/L Final    LDH 01/24/2025 130  84 - 246 U/L Final         Radiology Result:  I have reviewed the latest Imaging in PACS and the findings are noted in this note. I discussed the results of the latest imaging with the patient. All previous imaging were reviewed at the time it was completed. Full records are available in the EMR for review as well.     CT chest abdomen pelvis w IV contrast    Result Date: 1/28/2025  Impression: Soft tissue sarcoma restaging scan. When compared to the prior examination dated 07/22/2024, there has been no significant interval change and no definite evidence of new metastatic disease. Additional stable  "chronic and incidental findings described above.   I personally reviewed the image(s) / study and I agree with the findings as stated by Amparo Flores MD. This study was interpreted at [facility], [place], [place].   MACRO: None   Signed by: Aron Eli 1/28/2025 7:14 PM Dictation workstation:   [id number]        Pathology Results:  I have reviewed the full pathology report recorded in the EMR. The pertinent portions indicating diagnosis are listed here in the note. for details please refer to the full report recorded in the EMR.    Surgical Pathology [Mar 26 2021 10:11AM] ([id number])     Specimens: RIGHT INGUINAL LYMPH NODE /RIGHT GROIN ABDOMINAL SOFT TISSUE MASS /LEFT INGUINAL LYMPH NODE /Received fresh, labeled with the patient's name and hospital number and \"A,/Received fresh, labeled with the patient’s name, hospital number,  and/Received fresh, labeled with the patient's name and hospital number and \"C,      Name CARMEN SORENSEN      Accession #: [id number]   Pathologist: KINSEY MARIA MD   Date of Procedure: 3/10/2021   Date Received: 3/10/2021   Date Reported 3/26/2021   Submitting Physician: NICOLAS MARIE MD   Location: [facility] Other External #      FINAL DIAGNOSIS   A. 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   --SURGICAL MARGINS ARE NEGATIVE FOR MALIGNANCY.      Comment: The tumor is well defined and is comprised of malignant spindle cells with marked pleomorphism, up to 29 mitoses in 10 high-power fields and necrosis involving approximately 15% of tumor volume. The tumor cells show strong diffuse expression  of vimentin with patchy expression of S100 (also stains intratumoral dendritic cells), with patchy EMA expression. The tumor cells are negative for CK AE1/3, CAM 5.2, CD31, CD34, ERG, SMA, desmin, " myogenin, SOX10, HMB-45, CD21, CD23, and ALK 1. INI-1  nuclear expression is preserved. MDM 2 by dual-ARELY is not amplified. All surgical margins are negative for malignancy. The closest margin is deep with tumor approximately 0.1 cm away. All other margins are at least 0.5 cm from tumor. Dr. Hayes has reviewed  jc slides and concurs with the diagnosis.      C. LYMPH NODES, LEFT INGUINAL, EXCISION:   --METASTATIC SARCOMA INVOLVING 2 OF 2 LYMPH NODES (2/2).      Electronically Signed Out By KINSEY MARIA MD/STS   By the signature on this report, the individual or group listed as making the Final Interpretation/Diagnosis certifies that they have reviewed this case.      Assessment and Plan:   Assessment/Plan      Mr. Louie Urbina is a 79 y.o. 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 Please see the evolution of the case listed above in the cancer history.     Today,   We reviewed the CT scan report. There is no evidence of recurrence. We will continue surveillance.     # Undifferentiated Pleomorphic sarcoma:  - Currently on observation.   - CT scan with labs in 6 months. Visit after that.      # Elevated PSA/prostatomegaly   - He is being followed by urology.      # HTN  - Following with his PCP.      # Smoking cessation  - Tells me he is smoking a few cigarettes daily. He is working on quitting, but denies any assistance with this.      # Health maintenance  - Follows with PCP for wellness visits and age appropriate cancer screenings.          DISCLAIMER:   In preparing for this visit and writing this note, I reviewed all the previous electronic medical records (labs, imaging and medical charts) of the patient available in the physician portal. Significant findings which helped in decision making are recorded  in this chart.     The plan was discussed with the patient. We gave him ample opportunities to ask questions. All questions were answered to his  satisfaction and he verbalized understanding.      Nicko Gleason MD    Division of Hematology and Oncology  [facility]    Co-Director Sarcoma and Cutaneous Oncology  [facility]    Phone: [phone]  Fax: [phone]

## 2025-03-18 DIAGNOSIS — I10 BENIGN HYPERTENSION: ICD-10-CM

## 2025-03-18 RX ORDER — ATENOLOL 25 MG/1
25 TABLET ORAL DAILY
Qty: 90 TABLET | Refills: 0 | Status: SHIPPED | OUTPATIENT
Start: 2025-03-18

## 2025-03-18 RX ORDER — AMLODIPINE BESYLATE 5 MG/1
TABLET ORAL
Qty: 90 TABLET | Refills: 0 | Status: SHIPPED | OUTPATIENT
Start: 2025-03-18

## 2025-05-07 ENCOUNTER — APPOINTMENT (OUTPATIENT)
Dept: PRIMARY CARE | Facility: CLINIC | Age: 80
End: 2025-05-07
Payer: MEDICARE

## 2025-05-07 VITALS
HEART RATE: 56 BPM | HEIGHT: 68 IN | WEIGHT: 190 LBS | OXYGEN SATURATION: 97 % | RESPIRATION RATE: 18 BRPM | DIASTOLIC BLOOD PRESSURE: 70 MMHG | SYSTOLIC BLOOD PRESSURE: 134 MMHG | BODY MASS INDEX: 28.79 KG/M2

## 2025-05-07 DIAGNOSIS — I10 BENIGN HYPERTENSION: ICD-10-CM

## 2025-05-07 DIAGNOSIS — M54.50 ACUTE RIGHT-SIDED LOW BACK PAIN WITHOUT SCIATICA: ICD-10-CM

## 2025-05-07 DIAGNOSIS — R97.20 ELEVATED PSA: ICD-10-CM

## 2025-05-07 DIAGNOSIS — R21 RASH: ICD-10-CM

## 2025-05-07 DIAGNOSIS — Z00.00 MEDICARE ANNUAL WELLNESS VISIT, SUBSEQUENT: ICD-10-CM

## 2025-05-07 DIAGNOSIS — Z13.220 LIPID SCREENING: ICD-10-CM

## 2025-05-07 PROCEDURE — 99213 OFFICE O/P EST LOW 20 MIN: CPT | Performed by: INTERNAL MEDICINE

## 2025-05-07 PROCEDURE — 3075F SYST BP GE 130 - 139MM HG: CPT | Performed by: INTERNAL MEDICINE

## 2025-05-07 PROCEDURE — 1125F AMNT PAIN NOTED PAIN PRSNT: CPT | Performed by: INTERNAL MEDICINE

## 2025-05-07 PROCEDURE — 3078F DIAST BP <80 MM HG: CPT | Performed by: INTERNAL MEDICINE

## 2025-05-07 PROCEDURE — 1160F RVW MEDS BY RX/DR IN RCRD: CPT | Performed by: INTERNAL MEDICINE

## 2025-05-07 PROCEDURE — 1036F TOBACCO NON-USER: CPT | Performed by: INTERNAL MEDICINE

## 2025-05-07 PROCEDURE — 1170F FXNL STATUS ASSESSED: CPT | Performed by: INTERNAL MEDICINE

## 2025-05-07 PROCEDURE — G0439 PPPS, SUBSEQ VISIT: HCPCS | Performed by: INTERNAL MEDICINE

## 2025-05-07 PROCEDURE — 1159F MED LIST DOCD IN RCRD: CPT | Performed by: INTERNAL MEDICINE

## 2025-05-07 RX ORDER — PREDNISONE 20 MG/1
40 TABLET ORAL DAILY
Qty: 10 TABLET | Refills: 0 | Status: SHIPPED | OUTPATIENT
Start: 2025-05-07 | End: 2025-05-12

## 2025-05-07 RX ORDER — TRIAMCINOLONE ACETONIDE 5 MG/G
OINTMENT TOPICAL 2 TIMES DAILY
Qty: 45 G | Refills: 1 | Status: SHIPPED | OUTPATIENT
Start: 2025-05-07 | End: 2026-05-07

## 2025-05-07 RX ORDER — CELECOXIB 200 MG/1
200 CAPSULE ORAL DAILY
Qty: 30 CAPSULE | Refills: 0 | Status: SHIPPED | OUTPATIENT
Start: 2025-05-07 | End: 2025-11-03

## 2025-05-07 ASSESSMENT — PATIENT HEALTH QUESTIONNAIRE - PHQ9
2. FEELING DOWN, DEPRESSED OR HOPELESS: NOT AT ALL
SUM OF ALL RESPONSES TO PHQ9 QUESTIONS 1 AND 2: 0
1. LITTLE INTEREST OR PLEASURE IN DOING THINGS: NOT AT ALL

## 2025-05-07 ASSESSMENT — PAIN SCALES - GENERAL: PAINLEVEL_OUTOF10: 5

## 2025-05-07 ASSESSMENT — ENCOUNTER SYMPTOMS
NEUROLOGICAL NEGATIVE: 1
PSYCHIATRIC NEGATIVE: 1
BACK PAIN: 1
RESPIRATORY NEGATIVE: 1
GASTROINTESTINAL NEGATIVE: 1
CONSTITUTIONAL NEGATIVE: 1
CARDIOVASCULAR NEGATIVE: 1

## 2025-05-07 ASSESSMENT — ACTIVITIES OF DAILY LIVING (ADL)
TAKING_MEDICATION: INDEPENDENT
DRESSING: INDEPENDENT
GROCERY_SHOPPING: INDEPENDENT
DOING_HOUSEWORK: INDEPENDENT
BATHING: INDEPENDENT
MANAGING_FINANCES: INDEPENDENT

## 2025-05-07 NOTE — PATIENT INSTRUCTIONS
It was nice to see you today for your annual Medicare Wellness visit.  As discussed during our visit today ...    Please continue to take your current medications     Get xray of your back as soon as you are able. We will call you with results    2 prescriptions sent in for Prednisone and Celebrex     Follow up in six months

## 2025-05-07 NOTE — PROGRESS NOTES
"Patient ID:  Louie Urbina is a 79 y.o. 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    HEALTH MAINTENANCE: Follow Up/ Medicare annual wellness   Last Office Visit: 6/14/24 back pain resulting from coughing, prescribed prednisone, Zanaflex and Flonase   Last Labs: 1/24/25, due for lipid panel, TSH  PSA Screening (starting at age 55 till 69): 9/25/23 elevated  Colonoscopy (45-75 or age 40 with 1st degree relative dx colon ca): 12/30/23 per Dr. Nguyen, 3 polyps removed with following pathology: A.  Rectum, polyps, polypectomy:-Fragments of hyperplastic polyp(s). B.  Colon, rectosigmoid, polyp, polypectomy: -Fragments of tubular adenoma. ADVISED To repeat in 5 years  Lung cancer screening (50-76 y/o x 20 pk yr for at least 20 yrs + current smoker OR quit in last 15 years, no CT w/I last year):   DEXA (65+, q 2 years women and 70+ men):     Followed by oncology for  Undifferentiated Pleomorphic sarcoma, per most recent visit on 2/12/25: \"Currently on observation.  CT scan with labs in 6 months. Visit after that.\"    HPI He states about a week ago, he developed pain in his right side around to his back. He states he takes a tylenol and that gets him through day with pain. He states his recurrent rash is flaring up, needs cream for that. He states he is urinating ok. He states he took Meloxicam and ibuprofen for this pain and it did not help.     Social History[1]    Review of Systems   Constitutional: Negative.    HENT: Negative.     Respiratory: Negative.     Cardiovascular: Negative.    Gastrointestinal: Negative.    Genitourinary: Negative.    Musculoskeletal:  Positive for back pain.   Skin: Negative.    Neurological: Negative.    Psychiatric/Behavioral: Negative.       Visit Vitals  Vitals:    05/07/25 1422   BP: 134/70   BP Location: Left arm   Patient " "Position: Sitting   Pulse: 56   Resp: 18   SpO2: 97%   Weight: 86.2 kg (190 lb)   Height: 1.737 m (5' 8.39\")      Smoking Status Former     Physical Exam  Vitals reviewed.   Constitutional:       Appearance: Normal appearance.   HENT:      Head: Normocephalic and atraumatic.   Cardiovascular:      Rate and Rhythm: Normal rate and regular rhythm.      Pulses: Normal pulses.      Heart sounds: Normal heart sounds.   Pulmonary:      Effort: Pulmonary effort is normal.      Breath sounds: Normal breath sounds.   Abdominal:      General: Bowel sounds are normal.      Palpations: Abdomen is soft.   Musculoskeletal:         General: Normal range of motion.      Cervical back: Normal range of motion.      Comments: Leg raising produced pain at 30 degrees on right side; tenderness over spine, lateral aspect right side   Skin:     General: Skin is warm and dry.   Neurological:      General: No focal deficit present.      Mental Status: He is alert and oriented to person, place, and time.   Psychiatric:         Mood and Affect: Mood normal.         Behavior: Behavior normal.       Current Outpatient Medications   Medication Instructions    amLODIPine (Norvasc) 5 mg tablet TAKE ONE TABLET BY MOUTH once DAILY *need appointment for further refills*    atenolol (TENORMIN) 25 mg, oral, Daily    multivitamin tablet 1 tablet, oral, Daily      Lab Results   Component Value Date    WBC 6.9 01/24/2025    HGB 15.0 01/24/2025    HCT 46.5 01/24/2025     01/24/2025    ALT 20 01/24/2025    AST 16 01/24/2025     01/24/2025    K 4.6 01/24/2025     01/24/2025    CREATININE 1.17 01/24/2025    BUN 18 01/24/2025    CO2 28 01/24/2025    TSH 1.65 06/16/2023    PSA 5.11 (H) 09/25/2023    INR 1.6 (H) 03/10/2021       Problem List Items Addressed This Visit           ICD-10-CM    Benign hypertension I10    BP is good in office today, 134/70  Continue with Amlodipine, Atenolol         Acute right-sided low back pain without " sciatica  - concern for METS due to elevated PSA M54.50    Relevant Medications    predniSONE (Deltasone) 20 mg tablet    celecoxib (CeleBREX) 200 mg capsule    Rash R21    Relevant Medications    triamcinolone (Kenalog) 0.5 % ointment    Lipid screening Z13.220    Relevant Orders    Lipid panel    Elevated PSA R97.20    Relevant Orders    PSA, total and free     Other Visit Diagnoses         Codes      Medicare annual wellness visit, subsequent     Z00.00            --------------------  Written by Vandana Oviedo RN, acting as a scribe for Dr. Knox. This note accurately reflects the work and decisions made by Dr. Knox.     I, Dr. Knox, attest all medical record entries made by the scribe were under my direction and were personally dictated by me. I have reviewed the chart and agree that the record accurately reflects my performance of the history, physical exam, and assessment and plan.          [1]   Social History  Tobacco Use    Smoking status: Former     Current packs/day: 0.00     Average packs/day: 0.5 packs/day for 40.0 years (20.0 ttl pk-yrs)     Types: Cigarettes     Start date: 1983     Quit date: 2023     Years since quittin.3     Passive exposure: Never    Smokeless tobacco: Never   Vaping Use    Vaping status: Some Days    Passive vaping exposure: Yes   Substance Use Topics    Alcohol use: Yes     Comment: occasional beer- 1 x month    Drug use: Never

## 2025-05-08 ENCOUNTER — HOSPITAL ENCOUNTER (OUTPATIENT)
Dept: RADIOLOGY | Facility: HOSPITAL | Age: 80
Discharge: HOME | End: 2025-05-08
Payer: MEDICARE

## 2025-05-08 DIAGNOSIS — M54.50 ACUTE RIGHT-SIDED LOW BACK PAIN WITHOUT SCIATICA: ICD-10-CM

## 2025-05-08 PROBLEM — R97.20 ELEVATED PSA: Status: ACTIVE | Noted: 2025-05-08

## 2025-05-08 PROBLEM — Z13.220 LIPID SCREENING: Status: ACTIVE | Noted: 2025-05-08

## 2025-05-08 PROBLEM — R21 RASH: Status: ACTIVE | Noted: 2025-05-08

## 2025-05-08 PROCEDURE — 72110 X-RAY EXAM L-2 SPINE 4/>VWS: CPT | Performed by: RADIOLOGY

## 2025-05-08 PROCEDURE — 72110 X-RAY EXAM L-2 SPINE 4/>VWS: CPT

## 2025-06-22 DIAGNOSIS — I10 BENIGN HYPERTENSION: ICD-10-CM

## 2025-06-23 RX ORDER — AMLODIPINE BESYLATE 5 MG/1
TABLET ORAL
Qty: 90 TABLET | Refills: 1 | Status: SHIPPED | OUTPATIENT
Start: 2025-06-23

## 2025-06-23 RX ORDER — ATENOLOL 25 MG/1
25 TABLET ORAL DAILY
Qty: 90 TABLET | Refills: 1 | Status: SHIPPED | OUTPATIENT
Start: 2025-06-23

## 2025-08-14 ENCOUNTER — LAB (OUTPATIENT)
Dept: LAB | Facility: HOSPITAL | Age: 80
End: 2025-08-14
Payer: MEDICARE

## 2025-08-14 DIAGNOSIS — C49.9 UNDIFFERENTIATED PLEOMORPHIC SARCOMA (MULTI): ICD-10-CM

## 2025-08-14 DIAGNOSIS — C49.9 MALIGNANT NEOPLASM OF CONNECTIVE AND SOFT TISSUE, UNSPECIFIED: Primary | ICD-10-CM

## 2025-08-14 LAB
CREAT SERPL-MCNC: 1.01 MG/DL (ref 0.5–1.3)
EGFRCR SERPLBLD CKD-EPI 2021: 76 ML/MIN/1.73M*2

## 2025-08-14 PROCEDURE — 82565 ASSAY OF CREATININE: CPT

## 2025-08-15 ENCOUNTER — HOSPITAL ENCOUNTER (OUTPATIENT)
Dept: RADIOLOGY | Facility: HOSPITAL | Age: 80
Discharge: HOME | End: 2025-08-15
Payer: MEDICARE

## 2025-08-15 ENCOUNTER — APPOINTMENT (OUTPATIENT)
Dept: RADIOLOGY | Facility: HOSPITAL | Age: 80
End: 2025-08-15
Payer: MEDICARE

## 2025-08-15 DIAGNOSIS — C49.9 UNDIFFERENTIATED PLEOMORPHIC SARCOMA (MULTI): ICD-10-CM

## 2025-08-15 PROCEDURE — 2550000001 HC RX 255 CONTRASTS: Performed by: INTERNAL MEDICINE

## 2025-08-15 PROCEDURE — 74177 CT ABD & PELVIS W/CONTRAST: CPT

## 2025-08-15 RX ADMIN — IOHEXOL 75 ML: 350 INJECTION, SOLUTION INTRAVENOUS at 16:50

## 2025-08-20 ENCOUNTER — APPOINTMENT (OUTPATIENT)
Dept: HEMATOLOGY/ONCOLOGY | Facility: CLINIC | Age: 80
End: 2025-08-20
Payer: MEDICARE

## 2025-09-17 ENCOUNTER — APPOINTMENT (OUTPATIENT)
Dept: HEMATOLOGY/ONCOLOGY | Facility: CLINIC | Age: 80
End: 2025-09-17
Payer: MEDICARE

## 2025-11-12 ENCOUNTER — APPOINTMENT (OUTPATIENT)
Dept: PRIMARY CARE | Facility: CLINIC | Age: 80
End: 2025-11-12
Payer: MEDICARE